# Patient Record
Sex: MALE | Race: WHITE | NOT HISPANIC OR LATINO | Employment: UNEMPLOYED | URBAN - METROPOLITAN AREA
[De-identification: names, ages, dates, MRNs, and addresses within clinical notes are randomized per-mention and may not be internally consistent; named-entity substitution may affect disease eponyms.]

---

## 2019-11-22 ENCOUNTER — HOSPITAL ENCOUNTER (EMERGENCY)
Facility: HOSPITAL | Age: 49
Discharge: HOME | End: 2019-11-22
Attending: EMERGENCY MEDICINE | Admitting: EMERGENCY MEDICINE

## 2019-11-22 ENCOUNTER — APPOINTMENT (OUTPATIENT)
Dept: GENERAL RADIOLOGY | Facility: HOSPITAL | Age: 49
End: 2019-11-22

## 2019-11-22 VITALS
OXYGEN SATURATION: 96 % | DIASTOLIC BLOOD PRESSURE: 75 MMHG | SYSTOLIC BLOOD PRESSURE: 111 MMHG | TEMPERATURE: 98.5 F | RESPIRATION RATE: 18 BRPM | HEIGHT: 66 IN | BODY MASS INDEX: 28.77 KG/M2 | WEIGHT: 179.01 LBS | HEART RATE: 90 BPM

## 2019-11-22 DIAGNOSIS — R07.9 CHEST PAIN, UNSPECIFIED TYPE: ICD-10-CM

## 2019-11-22 DIAGNOSIS — J01.90 ACUTE NON-RECURRENT SINUSITIS, UNSPECIFIED LOCATION: Primary | ICD-10-CM

## 2019-11-22 LAB
ANION GAP SERPL CALCULATED.3IONS-SCNC: 12 MMOL/L (ref 5–15)
BASOPHILS # BLD AUTO: 0 10*3/MM3 (ref 0–0.2)
BASOPHILS NFR BLD AUTO: 0.2 % (ref 0–1.5)
BUN BLD-MCNC: 14 MG/DL (ref 6–20)
BUN/CREAT SERPL: 14.7 (ref 7–25)
CALCIUM SPEC-SCNC: 9.2 MG/DL (ref 8.6–10.5)
CHLORIDE SERPL-SCNC: 103 MMOL/L (ref 98–107)
CO2 SERPL-SCNC: 24 MMOL/L (ref 22–29)
CREAT BLD-MCNC: 0.95 MG/DL (ref 0.76–1.27)
DEPRECATED RDW RBC AUTO: 45.5 FL (ref 37–54)
EOSINOPHIL # BLD AUTO: 0.2 10*3/MM3 (ref 0–0.4)
EOSINOPHIL NFR BLD AUTO: 1.8 % (ref 0.3–6.2)
ERYTHROCYTE [DISTWIDTH] IN BLOOD BY AUTOMATED COUNT: 14 % (ref 12.3–15.4)
GFR SERPL CREATININE-BSD FRML MDRD: 85 ML/MIN/1.73
GLUCOSE BLD-MCNC: 106 MG/DL (ref 65–99)
HCT VFR BLD AUTO: 39.8 % (ref 37.5–51)
HGB BLD-MCNC: 14 G/DL (ref 13–17.7)
LYMPHOCYTES # BLD AUTO: 2.5 10*3/MM3 (ref 0.7–3.1)
LYMPHOCYTES NFR BLD AUTO: 28.3 % (ref 19.6–45.3)
MCH RBC QN AUTO: 32.3 PG (ref 26.6–33)
MCHC RBC AUTO-ENTMCNC: 35.1 G/DL (ref 31.5–35.7)
MCV RBC AUTO: 92 FL (ref 79–97)
MONOCYTES # BLD AUTO: 0.6 10*3/MM3 (ref 0.1–0.9)
MONOCYTES NFR BLD AUTO: 6.4 % (ref 5–12)
NEUTROPHILS # BLD AUTO: 5.5 10*3/MM3 (ref 1.7–7)
NEUTROPHILS NFR BLD AUTO: 63.3 % (ref 42.7–76)
NRBC BLD AUTO-RTO: 0.3 /100 WBC (ref 0–0.2)
PLATELET # BLD AUTO: 145 10*3/MM3 (ref 140–450)
PMV BLD AUTO: 9.1 FL (ref 6–12)
POTASSIUM BLD-SCNC: 4.1 MMOL/L (ref 3.5–5.2)
RBC # BLD AUTO: 4.33 10*6/MM3 (ref 4.14–5.8)
SODIUM BLD-SCNC: 139 MMOL/L (ref 136–145)
TROPONIN T SERPL-MCNC: <0.01 NG/ML (ref 0–0.03)
WBC NRBC COR # BLD: 8.7 10*3/MM3 (ref 3.4–10.8)

## 2019-11-22 PROCEDURE — 85025 COMPLETE CBC W/AUTO DIFF WBC: CPT | Performed by: EMERGENCY MEDICINE

## 2019-11-22 PROCEDURE — 84484 ASSAY OF TROPONIN QUANT: CPT | Performed by: EMERGENCY MEDICINE

## 2019-11-22 PROCEDURE — 93005 ELECTROCARDIOGRAM TRACING: CPT | Performed by: EMERGENCY MEDICINE

## 2019-11-22 PROCEDURE — 80048 BASIC METABOLIC PNL TOTAL CA: CPT | Performed by: EMERGENCY MEDICINE

## 2019-11-22 PROCEDURE — 71046 X-RAY EXAM CHEST 2 VIEWS: CPT

## 2019-11-22 PROCEDURE — 99283 EMERGENCY DEPT VISIT LOW MDM: CPT

## 2019-11-22 RX ORDER — AMOXICILLIN 500 MG/1
500 CAPSULE ORAL 3 TIMES DAILY
Qty: 30 CAPSULE | Refills: 0 | Status: SHIPPED | OUTPATIENT
Start: 2019-11-22 | End: 2021-07-14

## 2019-11-22 NOTE — ED PROVIDER NOTES
Subjective   49-year-old male with 1 week of cough productive yellow sputum associated sinus congestion and pain over sinuses, muscle aches, intermittent chest pain with cough.  No febrile sick contacts.  Patient also reports subjective fever with chills, none documented.  Patient's last antipyretics were ibuprofen at noon.  Symptoms have been moderate, again chest pain worse with cough.            Review of Systems   Constitutional: Positive for chills and fatigue.   HENT: Positive for congestion and sinus pain.    Respiratory: Positive for cough. Negative for shortness of breath.    Cardiovascular: Positive for chest pain.   Musculoskeletal: Positive for myalgias.   Neurological: Positive for headaches.   All other systems reviewed and are negative.      No past medical history on file.    No Known Allergies    No past surgical history on file.    No family history on file.    Social History     Socioeconomic History   • Marital status:      Spouse name: Not on file   • Number of children: Not on file   • Years of education: Not on file   • Highest education level: Not on file           Objective   Physical Exam   Constitutional: He is oriented to person, place, and time. He appears well-developed and well-nourished.   HENT:   Head: Normocephalic and atraumatic.   Mouth/Throat: Oropharynx is clear and moist.   Eyes:   Right pupil irregular status post remote trauma, left pupil round reactive to light   Neck: Normal range of motion. Neck supple.   Cardiovascular: Normal rate, regular rhythm and normal heart sounds.   Pulmonary/Chest: Effort normal and breath sounds normal.   Abdominal: Soft. Bowel sounds are normal. He exhibits no distension. There is no tenderness.   Musculoskeletal: Normal range of motion. He exhibits no edema or deformity.   Neurological: He is alert and oriented to person, place, and time.   Skin: Skin is warm and dry. Capillary refill takes less than 2 seconds.   Psychiatric: He has a  normal mood and affect. His behavior is normal.       Procedures           ED Course  ED Course as of Nov 22 0222 Fri Nov 22, 2019   0143 EKG interpretation: Normal sinus rhythm, rate 82, no acute ST elevation.  [JR]      ED Course User Index  [JR] Frank Lees MD                  MDM  Number of Diagnoses or Management Options  Acute non-recurrent sinusitis, unspecified location:   Chest pain, unspecified type:   Diagnosis management comments: Results for orders placed or performed during the hospital encounter of 11/22/19  -Basic Metabolic Panel       Result                      Value             Ref Range           Glucose                     106 (H)           65 - 99 mg/dL       BUN                         14                6 - 20 mg/dL        Creatinine                  0.95              0.76 - 1.27 *       Sodium                      139               136 - 145 mm*       Potassium                   4.1               3.5 - 5.2 mm*       Chloride                    103               98 - 107 mmo*       CO2                         24.0              22.0 - 29.0 *       Calcium                     9.2               8.6 - 10.5 m*       eGFR Non African Amer       85                >60 mL/min/1*       BUN/Creatinine Ratio        14.7              7.0 - 25.0          Anion Gap                   12.0              5.0 - 15.0 m*  -Troponin       Result                      Value             Ref Range           Troponin T                  <0.010            0.000 - 0.03*  -CBC Auto Differential       Result                      Value             Ref Range           WBC                         8.70              3.40 - 10.80*       RBC                         4.33              4.14 - 5.80 *       Hemoglobin                  14.0              13.0 - 17.7 *       Hematocrit                  39.8              37.5 - 51.0 %       MCV                         92.0              79.0 - 97.0 *       MCH                          32.3              26.6 - 33.0 *       MCHC                        35.1              31.5 - 35.7 *       RDW                         14.0              12.3 - 15.4 %       RDW-SD                      45.5              37.0 - 54.0 *       MPV                         9.1               6.0 - 12.0 fL       Platelets                   145               140 - 450 10*       Neutrophil %                63.3              42.7 - 76.0 %       Lymphocyte %                28.3              19.6 - 45.3 %       Monocyte %                  6.4               5.0 - 12.0 %        Eosinophil %                1.8               0.3 - 6.2 %         Basophil %                  0.2               0.0 - 1.5 %         Neutrophils, Absolute       5.50              1.70 - 7.00 *       Lymphocytes, Absolute       2.50              0.70 - 3.10 *       Monocytes, Absolute         0.60              0.10 - 0.90 *       Eosinophils, Absolute       0.20              0.00 - 0.40 *       Basophils, Absolute         0.00              0.00 - 0.20 *       nRBC                        0.3 (H)           0.0 - 0.2 /1*         Amount and/or Complexity of Data Reviewed  Clinical lab tests: reviewed  Tests in the radiology section of CPT®: reviewed  Independent visualization of images, tracings, or specimens: yes        Final diagnoses:   Acute non-recurrent sinusitis, unspecified location   Chest pain, unspecified type              Frank Lees MD  11/22/19 3738

## 2019-11-22 NOTE — ED NOTES
Pt reports to having chills, fever, cough, and headache for a week.  His grandchildren were sick last week with sinus infections.  No medications taken since 1200 11/21/19.     Krystle Melchor RN  11/22/19 0106

## 2021-07-14 ENCOUNTER — HOSPITAL ENCOUNTER (INPATIENT)
Facility: HOSPITAL | Age: 51
LOS: 4 days | Discharge: HOME | End: 2021-07-18
Attending: INTERNAL MEDICINE | Admitting: INTERNAL MEDICINE

## 2021-07-14 ENCOUNTER — APPOINTMENT (OUTPATIENT)
Dept: GENERAL RADIOLOGY | Facility: HOSPITAL | Age: 51
End: 2021-07-14

## 2021-07-14 DIAGNOSIS — L03.116 CELLULITIS OF LEFT LOWER EXTREMITY: Primary | ICD-10-CM

## 2021-07-14 PROBLEM — A41.9 SEPSIS (HCC): Status: ACTIVE | Noted: 2021-07-14

## 2021-07-14 LAB
ALBUMIN SERPL-MCNC: 4 G/DL (ref 3.5–5.2)
ALBUMIN/GLOB SERPL: 1.5 G/DL
ALP SERPL-CCNC: 105 U/L (ref 39–117)
ALT SERPL W P-5'-P-CCNC: 19 U/L (ref 1–41)
ANION GAP SERPL CALCULATED.3IONS-SCNC: 12 MMOL/L (ref 5–15)
AST SERPL-CCNC: 13 U/L (ref 1–40)
BASOPHILS # BLD AUTO: 0.1 10*3/MM3 (ref 0–0.2)
BASOPHILS NFR BLD AUTO: 0.8 % (ref 0–1.5)
BILIRUB SERPL-MCNC: 0.2 MG/DL (ref 0–1.2)
BUN SERPL-MCNC: 16 MG/DL (ref 6–20)
BUN/CREAT SERPL: 14 (ref 7–25)
CALCIUM SPEC-SCNC: 9.1 MG/DL (ref 8.6–10.5)
CHLORIDE SERPL-SCNC: 103 MMOL/L (ref 98–107)
CO2 SERPL-SCNC: 25 MMOL/L (ref 22–29)
CREAT SERPL-MCNC: 1.14 MG/DL (ref 0.76–1.27)
CRP SERPL-MCNC: 8.6 MG/DL (ref 0–0.5)
D-LACTATE SERPL-SCNC: 1.5 MMOL/L (ref 0.5–2)
DEPRECATED RDW RBC AUTO: 42.9 FL (ref 37–54)
EOSINOPHIL # BLD AUTO: 0.2 10*3/MM3 (ref 0–0.4)
EOSINOPHIL NFR BLD AUTO: 2.2 % (ref 0.3–6.2)
ERYTHROCYTE [DISTWIDTH] IN BLOOD BY AUTOMATED COUNT: 13.6 % (ref 12.3–15.4)
ERYTHROCYTE [SEDIMENTATION RATE] IN BLOOD: 37 MM/HR (ref 0–15)
GFR SERPL CREATININE-BSD FRML MDRD: 68 ML/MIN/1.73
GLOBULIN UR ELPH-MCNC: 2.7 GM/DL
GLUCOSE SERPL-MCNC: 104 MG/DL (ref 65–99)
HCT VFR BLD AUTO: 41.5 % (ref 37.5–51)
HGB BLD-MCNC: 14.3 G/DL (ref 13–17.7)
HOLD SPECIMEN: NORMAL
LYMPHOCYTES # BLD AUTO: 1.8 10*3/MM3 (ref 0.7–3.1)
LYMPHOCYTES NFR BLD AUTO: 24.2 % (ref 19.6–45.3)
MAGNESIUM SERPL-MCNC: 2 MG/DL (ref 1.6–2.6)
MCH RBC QN AUTO: 31.7 PG (ref 26.6–33)
MCHC RBC AUTO-ENTMCNC: 34.6 G/DL (ref 31.5–35.7)
MCV RBC AUTO: 91.7 FL (ref 79–97)
MONOCYTES # BLD AUTO: 0.3 10*3/MM3 (ref 0.1–0.9)
MONOCYTES NFR BLD AUTO: 4.6 % (ref 5–12)
NEUTROPHILS NFR BLD AUTO: 5 10*3/MM3 (ref 1.7–7)
NEUTROPHILS NFR BLD AUTO: 68.2 % (ref 42.7–76)
NRBC BLD AUTO-RTO: 0.1 /100 WBC (ref 0–0.2)
PLATELET # BLD AUTO: 138 10*3/MM3 (ref 140–450)
PMV BLD AUTO: 9.4 FL (ref 6–12)
POTASSIUM SERPL-SCNC: 4.2 MMOL/L (ref 3.5–5.2)
PROCALCITONIN SERPL-MCNC: 0.46 NG/ML (ref 0–0.25)
PROT SERPL-MCNC: 6.7 G/DL (ref 6–8.5)
RBC # BLD AUTO: 4.53 10*6/MM3 (ref 4.14–5.8)
SODIUM SERPL-SCNC: 140 MMOL/L (ref 136–145)
WBC # BLD AUTO: 7.3 10*3/MM3 (ref 3.4–10.8)
WHOLE BLOOD HOLD SPECIMEN: NORMAL

## 2021-07-14 PROCEDURE — 87040 BLOOD CULTURE FOR BACTERIA: CPT | Performed by: NURSE PRACTITIONER

## 2021-07-14 PROCEDURE — 80053 COMPREHEN METABOLIC PANEL: CPT | Performed by: NURSE PRACTITIONER

## 2021-07-14 PROCEDURE — 36415 COLL VENOUS BLD VENIPUNCTURE: CPT

## 2021-07-14 PROCEDURE — U0004 COV-19 TEST NON-CDC HGH THRU: HCPCS | Performed by: INTERNAL MEDICINE

## 2021-07-14 PROCEDURE — 85652 RBC SED RATE AUTOMATED: CPT | Performed by: NURSE PRACTITIONER

## 2021-07-14 PROCEDURE — G0378 HOSPITAL OBSERVATION PER HR: HCPCS

## 2021-07-14 PROCEDURE — 73610 X-RAY EXAM OF ANKLE: CPT

## 2021-07-14 PROCEDURE — 84145 PROCALCITONIN (PCT): CPT | Performed by: NURSE PRACTITIONER

## 2021-07-14 PROCEDURE — 25010000002 VANCOMYCIN PER 500 MG: Performed by: NURSE PRACTITIONER

## 2021-07-14 PROCEDURE — 87641 MR-STAPH DNA AMP PROBE: CPT | Performed by: NURSE PRACTITIONER

## 2021-07-14 PROCEDURE — 86140 C-REACTIVE PROTEIN: CPT | Performed by: NURSE PRACTITIONER

## 2021-07-14 PROCEDURE — 25010000002 VANCOMYCIN 10 G RECONSTITUTED SOLUTION: Performed by: NURSE PRACTITIONER

## 2021-07-14 PROCEDURE — 83605 ASSAY OF LACTIC ACID: CPT

## 2021-07-14 PROCEDURE — 93010 ELECTROCARDIOGRAM REPORT: CPT | Performed by: INTERNAL MEDICINE

## 2021-07-14 PROCEDURE — 25010000002 CEFTRIAXONE PER 250 MG: Performed by: NURSE PRACTITIONER

## 2021-07-14 PROCEDURE — 99219 PR INITIAL OBSERVATION CARE/DAY 50 MINUTES: CPT | Performed by: NURSE PRACTITIONER

## 2021-07-14 PROCEDURE — 83735 ASSAY OF MAGNESIUM: CPT | Performed by: NURSE PRACTITIONER

## 2021-07-14 PROCEDURE — 85025 COMPLETE CBC W/AUTO DIFF WBC: CPT | Performed by: NURSE PRACTITIONER

## 2021-07-14 PROCEDURE — 83036 HEMOGLOBIN GLYCOSYLATED A1C: CPT | Performed by: NURSE PRACTITIONER

## 2021-07-14 PROCEDURE — 73630 X-RAY EXAM OF FOOT: CPT

## 2021-07-14 PROCEDURE — 93005 ELECTROCARDIOGRAM TRACING: CPT | Performed by: NURSE PRACTITIONER

## 2021-07-14 PROCEDURE — 99284 EMERGENCY DEPT VISIT MOD MDM: CPT

## 2021-07-14 RX ORDER — SODIUM CHLORIDE 9 MG/ML
75 INJECTION, SOLUTION INTRAVENOUS CONTINUOUS
Status: DISCONTINUED | OUTPATIENT
Start: 2021-07-14 | End: 2021-07-17

## 2021-07-14 RX ORDER — ACETAMINOPHEN 325 MG/1
650 TABLET ORAL EVERY 4 HOURS PRN
Status: DISCONTINUED | OUTPATIENT
Start: 2021-07-14 | End: 2021-07-18 | Stop reason: HOSPADM

## 2021-07-14 RX ORDER — ACETAMINOPHEN 650 MG/1
650 SUPPOSITORY RECTAL EVERY 4 HOURS PRN
Status: DISCONTINUED | OUTPATIENT
Start: 2021-07-14 | End: 2021-07-18 | Stop reason: HOSPADM

## 2021-07-14 RX ORDER — CLINDAMYCIN HYDROCHLORIDE 300 MG/1
300 CAPSULE ORAL 4 TIMES DAILY
COMMUNITY
Start: 2021-07-13 | End: 2021-07-18 | Stop reason: HOSPADM

## 2021-07-14 RX ORDER — CHOLECALCIFEROL (VITAMIN D3) 125 MCG
5 CAPSULE ORAL NIGHTLY PRN
Status: DISCONTINUED | OUTPATIENT
Start: 2021-07-14 | End: 2021-07-18 | Stop reason: HOSPADM

## 2021-07-14 RX ORDER — SODIUM CHLORIDE 0.9 % (FLUSH) 0.9 %
10 SYRINGE (ML) INJECTION AS NEEDED
Status: DISCONTINUED | OUTPATIENT
Start: 2021-07-14 | End: 2021-07-18 | Stop reason: HOSPADM

## 2021-07-14 RX ORDER — SODIUM CHLORIDE 0.9 % (FLUSH) 0.9 %
10 SYRINGE (ML) INJECTION EVERY 12 HOURS SCHEDULED
Status: DISCONTINUED | OUTPATIENT
Start: 2021-07-14 | End: 2021-07-18 | Stop reason: HOSPADM

## 2021-07-14 RX ORDER — TRAMADOL HYDROCHLORIDE 50 MG/1
50 TABLET ORAL EVERY 6 HOURS PRN
Status: DISCONTINUED | OUTPATIENT
Start: 2021-07-14 | End: 2021-07-18 | Stop reason: HOSPADM

## 2021-07-14 RX ORDER — POTASSIUM CHLORIDE 20 MEQ/1
40 TABLET, EXTENDED RELEASE ORAL AS NEEDED
Status: DISCONTINUED | OUTPATIENT
Start: 2021-07-14 | End: 2021-07-18 | Stop reason: HOSPADM

## 2021-07-14 RX ORDER — KETOROLAC TROMETHAMINE 30 MG/ML
30 INJECTION, SOLUTION INTRAMUSCULAR; INTRAVENOUS EVERY 6 HOURS PRN
Status: ACTIVE | OUTPATIENT
Start: 2021-07-14 | End: 2021-07-16

## 2021-07-14 RX ORDER — MAGNESIUM SULFATE HEPTAHYDRATE 40 MG/ML
2 INJECTION, SOLUTION INTRAVENOUS AS NEEDED
Status: DISCONTINUED | OUTPATIENT
Start: 2021-07-14 | End: 2021-07-18 | Stop reason: HOSPADM

## 2021-07-14 RX ORDER — NITROGLYCERIN 0.4 MG/1
0.4 TABLET SUBLINGUAL
Status: DISCONTINUED | OUTPATIENT
Start: 2021-07-14 | End: 2021-07-18 | Stop reason: HOSPADM

## 2021-07-14 RX ORDER — ACETAMINOPHEN 160 MG/5ML
650 SOLUTION ORAL EVERY 4 HOURS PRN
Status: DISCONTINUED | OUTPATIENT
Start: 2021-07-14 | End: 2021-07-18 | Stop reason: HOSPADM

## 2021-07-14 RX ORDER — ONDANSETRON 4 MG/1
4 TABLET, FILM COATED ORAL EVERY 6 HOURS PRN
Status: DISCONTINUED | OUTPATIENT
Start: 2021-07-14 | End: 2021-07-18 | Stop reason: HOSPADM

## 2021-07-14 RX ORDER — MAGNESIUM SULFATE 1 G/100ML
1 INJECTION INTRAVENOUS AS NEEDED
Status: DISCONTINUED | OUTPATIENT
Start: 2021-07-14 | End: 2021-07-18 | Stop reason: HOSPADM

## 2021-07-14 RX ORDER — HYDROCODONE BITARTRATE AND ACETAMINOPHEN 7.5; 325 MG/1; MG/1
1 TABLET ORAL EVERY 6 HOURS PRN
Status: DISCONTINUED | OUTPATIENT
Start: 2021-07-14 | End: 2021-07-18 | Stop reason: HOSPADM

## 2021-07-14 RX ORDER — ALUMINA, MAGNESIA, AND SIMETHICONE 2400; 2400; 240 MG/30ML; MG/30ML; MG/30ML
15 SUSPENSION ORAL EVERY 6 HOURS PRN
Status: DISCONTINUED | OUTPATIENT
Start: 2021-07-14 | End: 2021-07-18 | Stop reason: HOSPADM

## 2021-07-14 RX ORDER — ONDANSETRON 2 MG/ML
4 INJECTION INTRAMUSCULAR; INTRAVENOUS EVERY 6 HOURS PRN
Status: DISCONTINUED | OUTPATIENT
Start: 2021-07-14 | End: 2021-07-18 | Stop reason: HOSPADM

## 2021-07-14 RX ADMIN — HYDROCODONE BITARTRATE AND ACETAMINOPHEN 1 TABLET: 7.5; 325 TABLET ORAL at 23:05

## 2021-07-14 RX ADMIN — Medication 10 ML: at 23:06

## 2021-07-14 RX ADMIN — SODIUM CHLORIDE 125 ML/HR: 9 INJECTION, SOLUTION INTRAVENOUS at 23:05

## 2021-07-14 RX ADMIN — SODIUM CHLORIDE 2313 ML: 9 INJECTION, SOLUTION INTRAVENOUS at 23:04

## 2021-07-14 RX ADMIN — VANCOMYCIN HYDROCHLORIDE 1500 MG: 500 INJECTION, POWDER, LYOPHILIZED, FOR SOLUTION INTRAVENOUS at 19:48

## 2021-07-14 RX ADMIN — CEFTRIAXONE SODIUM 1 G: 1 INJECTION, POWDER, FOR SOLUTION INTRAMUSCULAR; INTRAVENOUS at 22:44

## 2021-07-14 NOTE — ED NOTES
Patient presents with a red, ramona, warm left foot that has 3+ pitting edema, very painful to the touch.  Patient states that it started three days ago and he went to [facility] two days ago he said he started an ATB.  [facility] ED said to come back to the ED if his symptoms worsen.       Venus Young, RN  07/14/21 8374

## 2021-07-14 NOTE — ED PROVIDER NOTES
Subjective   Patient presents with:  Cellulitis: dx with cellulitis at [facility] 2 days ago, pt states getting wore, heat, redness, swelling and pain to left foot.    Provider, No Known  No LMP for male patient.  No Known Allergies  Onset: This week    Context:  Patient is a 50-year-old male, presents emergency department with a complaint of left foot pain, redness and swelling.  Patient reports this began earlier this week, he states that he felt his foot was swollen in his boot, and noticed he had redness.  He was seen at [facility] 2 days ago, started on clindamycin, he reports the redness, swelling and pain is worsened.  He denies any fever or chills.  No history of IV drug use.          Review of Systems   Constitutional: Negative for chills and fever.   Respiratory: Negative for cough, chest tightness and shortness of breath.    Cardiovascular: Negative for chest pain, palpitations and leg swelling.   Gastrointestinal: Negative for abdominal pain, diarrhea, nausea and vomiting.   Musculoskeletal: Negative for back pain and neck pain.        Left foot pain, swelling   Skin: Positive for color change. Negative for rash.       History reviewed. No pertinent past medical history.    No Known Allergies    History reviewed. No pertinent surgical history.    History reviewed. No pertinent family history.    Social History     Socioeconomic History   • Marital status:      Spouse name: Not on file   • Number of children: Not on file   • Years of education: Not on file   • Highest education level: Not on file   Tobacco Use   • Smoking status: Current Every Day Smoker     Packs/day: 1.00     Start date: 7/14/1986   • Smokeless tobacco: Never Used   Vaping Use   • Vaping Use: Never used   Substance and Sexual Activity   • Alcohol use: Never   • Drug use: Never   • Sexual activity: Defer           Objective   Physical Exam  Vitals and nursing note reviewed.   Constitutional:       General: He is not in acute  "distress.     Appearance: Normal appearance. He is not ill-appearing, toxic-appearing or diaphoretic.   HENT:      Head: Normocephalic and atraumatic.      Nose: Nose normal.      Mouth/Throat:      Mouth: Mucous membranes are moist.      Pharynx: Oropharynx is clear.   Eyes:      Extraocular Movements: Extraocular movements intact.      Conjunctiva/sclera: Conjunctivae normal.      Pupils: Pupils are equal, round, and reactive to light.   Cardiovascular:      Rate and Rhythm: Normal rate and regular rhythm.      Pulses: Normal pulses.      Heart sounds: Normal heart sounds. No murmur heard.   No friction rub. No gallop.    Pulmonary:      Effort: Pulmonary effort is normal.      Breath sounds: Normal breath sounds.   Abdominal:      General: Bowel sounds are normal.      Palpations: Abdomen is soft.   Musculoskeletal:         General: Normal range of motion.      Cervical back: Normal range of motion and neck supple.        Legs:       Comments: Erythema noted to left foot, faint streaking noted into the leg.  Range of motion of the joint present.  No bony tenderness is noted.  Pedal pulses intact.  No evidence for abscess.   Skin:     General: Skin is warm and dry.      Capillary Refill: Capillary refill takes less than 2 seconds.      Findings: Erythema present.   Neurological:      General: No focal deficit present.      Mental Status: He is alert and oriented to person, place, and time.   Psychiatric:         Mood and Affect: Mood normal.         Behavior: Behavior normal.         Procedures           ED Course      /74 (BP Location: Left arm, Patient Position: Lying)   Pulse 104   Temp 99 °F (37.2 °C) (Oral)   Resp 16   Ht 167.6 cm (66\")   Wt 77.1 kg (170 lb)   SpO2 94%   BMI 27.44 kg/m²   Labs Reviewed   COMPREHENSIVE METABOLIC PANEL - Abnormal; Notable for the following components:       Result Value    Glucose 104 (*)     All other components within normal limits    Narrative:     GFR Normal " ">60  Chronic Kidney Disease <60  Kidney Failure <15     C-REACTIVE PROTEIN - Abnormal; Notable for the following components:    C-Reactive Protein 8.60 (*)     All other components within normal limits   SEDIMENTATION RATE - Abnormal; Notable for the following components:    Sed Rate 37 (*)     All other components within normal limits   CBC WITH AUTO DIFFERENTIAL - Abnormal; Notable for the following components:    Platelets 138 (*)     Monocyte % 4.6 (*)     All other components within normal limits   PROCALCITONIN - Abnormal; Notable for the following components:    Procalcitonin 0.46 (*)     All other components within normal limits    Narrative:     As a Marker for Sepsis (Non-Neonates):     1. <0.5 ng/mL represents a low risk of severe sepsis and/or septic shock.  2. >2 ng/mL represents a high risk of severe sepsis and/or septic shock.    As a Marker for Lower Respiratory Tract Infections that require antibiotic therapy:  PCT on Admission     Antibiotic Therapy             6-12 Hrs later  >0.5                          Strongly Recommended            >0.25 - <0.5             Recommended  0.1 - 0.25                  Discouraged                       Remeasure/reassess PCT  <0.1                         Strongly Discouraged         Remeasure/reassess PCT      As 28 day mortality risk marker: \"Change in Procalcitonin Result\" (>80% or <=80%) if Day 0 (or Day 1) and Day 4 values are available. Refer to http://www.[employer]-pct-calculator.com/    Change in PCT <=80 %   A decrease of PCT levels below or equal to 80% defines a positive change in PCT test result representing a higher risk for 28-day all-cause mortality of patients diagnosed with severe sepsis or septic shock.    Change in PCT >80 %   A decrease of PCT levels of more than 80% defines a negative change in PCT result representing a lower risk for 28-day all-cause mortality of patients diagnosed with severe sepsis or septic shock.              Results may be " falsely decreased if patient taking Biotin.    COMPREHENSIVE METABOLIC PANEL - Abnormal; Notable for the following components:    Glucose 124 (*)     Calcium 8.3 (*)     Albumin 3.40 (*)     All other components within normal limits    Narrative:     GFR Normal >60  Chronic Kidney Disease <60  Kidney Failure <15     CBC WITH AUTO DIFFERENTIAL - Abnormal; Notable for the following components:    RBC 4.08 (*)     Hemoglobin 12.9 (*)     Hematocrit 37.3 (*)     Platelets 138 (*)     All other components within normal limits   LACTIC ACID, PLASMA - Normal   MAGNESIUM - Normal   MAGNESIUM - Normal   POC LACTATE - Normal   BLOOD CULTURE   BLOOD CULTURE   COVID PRE-OP / PRE-PROCEDURE SCREENING ORDER (NO ISOLATION)    Narrative:     The following orders were created for panel order COVID PRE-OP / PRE-PROCEDURE SCREENING ORDER (NO ISOLATION) - Swab, Nasopharynx.  Procedure                               Abnormality         Status                     ---------                               -----------         ------                     COVID-19,APTIMA PANTHER,...[[id number]]                      In process                   Please view results for these tests on the individual orders.   COVID-19,APTIMA PANTHER,JAMIE IN-HOUSE,NP/OP SWAB IN UTM/VTM/SALINE TRANSPORT MEDIA,24 HR TAT   MRSA SCREEN, PCR   RAINBOW DRAW    Narrative:     The following orders were created for panel order Brea Draw.  Procedure                               Abnormality         Status                     ---------                               -----------         ------                     Lavender Top[[id number]]                                     Final result                 Please view results for these tests on the individual orders.   HEMOGLOBIN A1C   URINE DRUG SCREEN   URINALYSIS W/ CULTURE IF INDICATED   LACTIC ACID, PLASMA   POC LACTATE   CBC AND DIFFERENTIAL    Narrative:     The following orders were created for panel order CBC &  Differential.  Procedure                               Abnormality         Status                     ---------                               -----------         ------                     CBC Auto Differential[[id number]]        Abnormal            Final result                 Please view results for these tests on the individual orders.   EXTRA TUBES    Narrative:     The following orders were created for panel order Extra Tubes.  Procedure                               Abnormality         Status                     ---------                               -----------         ------                     Gold Top - SST[[id number]]                                   Final result                 Please view results for these tests on the individual orders.   GOLD TOP - SST   LAVENDER TOP   CBC AND DIFFERENTIAL    Narrative:     The following orders were created for panel order CBC & Differential.  Procedure                               Abnormality         Status                     ---------                               -----------         ------                     CBC Auto Differential[[id number]]        Abnormal            Final result                 Please view results for these tests on the individual orders.     Medications   sodium chloride 0.9 % flush 10 mL (has no administration in time range)   nitroglycerin (NITROSTAT) SL tablet 0.4 mg (has no administration in time range)   sodium chloride 0.9 % flush 10 mL (10 mL Intravenous Given 7/14/21 6676)   sodium chloride 0.9 % flush 10 mL (has no administration in time range)   acetaminophen (TYLENOL) tablet 650 mg (has no administration in time range)     Or   acetaminophen (TYLENOL) 160 MG/5ML solution 650 mg (has no administration in time range)     Or   acetaminophen (TYLENOL) suppository 650 mg (has no administration in time range)   aluminum-magnesium hydroxide-simethicone (MAALOX MAX) 400-400-40 MG/5ML suspension 15 mL (has no administration in time range)    ondansetron (ZOFRAN) tablet 4 mg (has no administration in time range)     Or   ondansetron (ZOFRAN) injection 4 mg (has no administration in time range)   melatonin tablet 5 mg (has no administration in time range)   potassium chloride (K-DUR,KLOR-CON) CR tablet 40 mEq (has no administration in time range)   Magnesium Sulfate 2 gram infusion - Mg less than or equal to 1.5 mg/dL (has no administration in time range)     Or   Magnesium Sulfate 1 gram infusion - Mg 1.6-1.9 mg/dL (has no administration in time range)   enoxaparin (LOVENOX) syringe 40 mg (has no administration in time range)   sodium chloride 0.9 % infusion (125 mL/hr Intravenous New Bag 7/14/21 2305)   ketorolac (TORADOL) injection 30 mg (has no administration in time range)   traMADol (ULTRAM) tablet 50 mg (has no administration in time range)   HYDROcodone-acetaminophen (NORCO) 7.5-325 MG per tablet 1 tablet (1 tablet Oral Given 7/14/21 2305)   Pharmacy to dose vancomycin (has no administration in time range)   cefTRIAXone (ROCEPHIN) 1 g in sodium chloride 0.9 % 100 mL IVPB (1 g Intravenous New Bag 7/14/21 2244)   vancomycin (VANCOCIN) 1,000 mg in sodium chloride 0.9 % 250 mL IVPB (has no administration in time range)   vancomycin 1500 mg/500 mL 0.9% NS IVPB ([facility]) (0 mg Intravenous Stopped 7/14/21 2245)   sodium chloride 0.9 % bolus 2,313 mL (2,313 mL Intravenous New Bag 7/14/21 2304)     XR Ankle 3+ View Left    Result Date: 7/14/2021  1.Diffuse soft tissue selling. 2.6 mm osseous density at the distal tip of the lateral malleolus, likely an old fracture fragment. Recommend correlation with physical exam.  Electronically Signed By-Vannessa Levi MD On:7/14/2021 6:45 PM This report was finalized on [id number] by  Vannessa Levi MD.    XR Foot 3+ View Left    Result Date: 7/14/2021  Diffuse soft tissue swelling without definite fracture.  Electronically Signed By-Vannessa Levi MD On:7/14/2021 6:44 PM This report was finalized on [id number]  by  Vannessa Levi MD.                                         MDM  Number of Diagnoses or Management Options  Cellulitis of left lower extremity  Diagnosis management comments: Appropriate PPE was worn during the duration of the care for this patient while in the emergency department per [facility] Policy    ----Differentials: Abscess, septic arthritis, cellulitis  This list is not all inclusive and does not constitute the entireity of considered causes.     ----Lab and imaging reviewd by me, imaging interpreted per radiologist and independly viewed by me: As above    ED  Course----Patient was brought back to the emergency department room for evaluation and placed on appropriate monitoring.      Vital signs have  been reviewed. Patient is afebrile. Non toxic in appearance. Alert and oriented to person, place, time and situation.  Patient has findings concerning for cellulitis of the left lower extremity, he does have streaking noted, he has been on outpatient antibiotics and had worsening symptoms, he will be admitted for IV antibiotics.    I discussed with the patient their test results, work-up here in the emergency department, and need for admission and further evaluation. Patient is agreeable to the plan of care. At time of disposition patients VS are non concerning, and without acute distress.  Opportunity was provided for questions at the bedside, all questions and concerns were addressed.                               Amount and/or Complexity of Data Reviewed  Clinical lab tests: reviewed  Tests in the radiology section of CPT®: reviewed  Discuss the patient with other providers: yes (Hospitalist)    Patient Progress  Patient progress: stable      Final diagnoses:   Cellulitis of left lower extremity       ED Disposition  ED Disposition     ED Disposition Condition Comment    Decision to Admit  Level of Care: Med/Surg [1]   Diagnosis: Cellulitis of left lower extremity [041600]   Admitting Physician:  ALEXY PRIEST N [[id number]]   Attending Physician: ALEXY PRIEST N [[id number]]            No follow-up provider specified.       Medication List      No changes were made to your prescriptions during this visit.          Joan Wagner, APRN  07/15/21 0146

## 2021-07-14 NOTE — H&P
[facility] Medicine Services      Patient Name: Boby Samson  : 1970  MRN: [id number]  Primary Care Physician:  Provider, No Known  Date of admission: 2021      Subjective          Chief Complaint: Left lower extremity edema      History of Present Illness:     Boby Samson is a 50 y.o. male with no significant PMH who presents to [facility] ED due to left lower extremity edema.  Patient states left lower extremity edema has progressively worsened over the last 7 days, pain is worse with palpation and exertion and relieved with rest.  Patient denies any other current symptoms.  Patient reports going to [facility] ED and receiving clindamycin which he has been taking for 2 days with no improvement so he decided to come to [facility] ED.    Upon arrival to the ED vitals temp 97 9, , RR 18, /72, O2 sat 96% on room air.  Labs notable for glucose 104, , K4.2, creatinine 1.14, BUN 16, ALT 19, AST 13, total bili 0.2, CRP 8.6, lactic 1.5, WBC 7.3, Hgb 14.3, neutrophils 68.2, sed rate 37.  XR left ankle shows diffuse soft tissue swelling. 2.6 mm osseous density at the distal tip of the lateral malleolus, likely an old fracture fragment. Recommend correlation with physical exam.  XR left foot shows there is diffuse soft tissue swelling which appears greatest over the forefoot. No acute fracture or dislocation is seen. There is a small plantar calcaneal spur. Probable old fracture fragment is seen at the distal tip of the lateral malleolus.  Patient treated in the ED with IV vancomycin.      Review of Systems   Constitutional: Negative.   HENT: Negative.    Eyes: Negative.    Cardiovascular: Negative.    Respiratory: Negative.    Endocrine: Negative.    Hematologic/Lymphatic: Negative.    Skin: Positive for color change, dry skin and poor wound healing.   Musculoskeletal: Negative.    Gastrointestinal: Negative.    Genitourinary: Negative.    Neurological:  Negative.    Psychiatric/Behavioral: Negative.    Allergic/Immunologic: Negative.    All other systems reviewed and are negative.       Personal History     History reviewed. No pertinent past medical history.    History reviewed. No pertinent surgical history.    Family History:  Otherwise pertinent FHx was reviewed and not pertinent to current issue.    Social History:  reports that he has been smoking. He started smoking about 35 years ago. He has been smoking about 1.00 pack per day. He has never used smokeless tobacco. He reports that he does not drink alcohol and does not use drugs.    Home Medications:  Prior to Admission Medications     Prescriptions Last Dose Informant Patient Reported? Taking?    clindamycin (CLEOCIN) 300 MG capsule   Yes Yes    Take 300 mg by mouth 3 (Three) Times a Day.    amoxicillin (AMOXIL) 500 MG capsule   No No    Take 1 capsule by mouth 3 (Three) Times a Day.            Allergies:  No Known Allergies    Objective      Vitals:   Temp:  [97.9 °F (36.6 °C)-99 °F (37.2 °C)] 98.1 °F (36.7 °C)  Heart Rate:  [] 88  Resp:  [16-18] 16  BP: (112-130)/(71-76) 118/71    Physical Exam  Constitutional:       Appearance: Normal appearance. He is normal weight.   HENT:      Head: Normocephalic and atraumatic.      Right Ear: External ear normal.      Left Ear: External ear normal.      Nose: Nose normal.      Mouth/Throat:      Mouth: Mucous membranes are moist.   Eyes:      Pupils: Pupils are equal, round, and reactive to light.   Cardiovascular:      Rate and Rhythm: Normal rate and regular rhythm.      Pulses: Normal pulses.      Heart sounds: Normal heart sounds.   Pulmonary:      Effort: Pulmonary effort is normal.      Breath sounds: Normal breath sounds.   Abdominal:      General: Bowel sounds are normal.      Palpations: Abdomen is soft.   Musculoskeletal:         General: Swelling and tenderness present.      Cervical back: Normal range of motion.      Left lower leg: Edema present.    Skin:     General: Skin is warm.      Findings: Erythema present.   Neurological:      General: No focal deficit present.      Mental Status: He is alert and oriented to person, place, and time. Mental status is at baseline.   Psychiatric:         Attention and Perception: Attention and perception normal.         Mood and Affect: Mood and affect normal.         Speech: Speech normal.         Behavior: Behavior normal.         Thought Content: Thought content normal.         Cognition and Memory: Cognition and memory normal.         Judgment: Judgment normal.          Result Review    Result Review:  I have personally reviewed the results from the time of this admission to 7/15/2021 06:39 EDT and agree with these findings:  [x]  Laboratory  [x]  Microbiology  [x]  Radiology  [x]  EKG/Telemetry   []  Cardiology/Vascular   []  Pathology  []  Old records  []  Other:        Assessment/Plan        Active Hospital Problems:  Active Hospital Problems    Diagnosis    • **Cellulitis of left lower extremity    • Sepsis (CMS/[facility])      Plan:     Left lower extremity cellulitis:  -Failed outpatient therapy after treatment at [facility] ED  -XR left ankle shows diffuse soft tissue swelling. 2.6 mm osseous density at the distal tip of the lateral malleolus, likely an old fracture fragment  -XR left foot shows there is diffuse soft tissue swelling which appears greatest over the forefoot. No acute fracture or dislocation is seen. There is a small plantar calcaneal spur. Probable old fracture fragment is seen at the distal tip of the lateral malleolus  -Patient treated in the ED with IV vancomycin  -Observe overnight, consult podiatry if indicated    Sepsis:  -Upon arrival to the ED heart rate 119, /72, lactic 1.5, neutrophils 68.2, sed rate 37, patient alert and oriented  -Blood cultures have been drawn and results are pending  -On arrival to the ED temperature was  , heart rate  , blood pressure patient alert  and oriented  -Fluid resuscitation with normal saline ordered per sepsis protocol  -Patient received vancomycin IVPB in the ED  - mL/hour continuous IV infusion  -1 g IV Rocephin, pharmacy to dose IV vancomycin  -Trend lactic acid levels to 6 hours x 2                DVT prophylaxis:  Medical DVT prophylaxis orders are present.    CODE STATUS:    Level Of Support Discussed With: Patient  Code Status: CPR  Medical Interventions (Level of Support Prior to Arrest): Full    Admission Status:  I believe this patient meets observation status.    Signature: Electronically signed by SRI Weber, 07/15/21, 6:50 AM EDT.

## 2021-07-15 PROBLEM — F15.10 METHAMPHETAMINE ABUSE (HCC): Status: ACTIVE | Noted: 2021-07-15

## 2021-07-15 LAB
ALBUMIN SERPL-MCNC: 3.4 G/DL (ref 3.5–5.2)
ALBUMIN/GLOB SERPL: 1.1 G/DL
ALP SERPL-CCNC: 91 U/L (ref 39–117)
ALT SERPL W P-5'-P-CCNC: 20 U/L (ref 1–41)
AMPHET+METHAMPHET UR QL: POSITIVE
ANION GAP SERPL CALCULATED.3IONS-SCNC: 9 MMOL/L (ref 5–15)
AST SERPL-CCNC: 16 U/L (ref 1–40)
BARBITURATES UR QL SCN: NEGATIVE
BASOPHILS # BLD AUTO: 0.1 10*3/MM3 (ref 0–0.2)
BASOPHILS NFR BLD AUTO: 1.2 % (ref 0–1.5)
BENZODIAZ UR QL SCN: NEGATIVE
BILIRUB SERPL-MCNC: 0.2 MG/DL (ref 0–1.2)
BILIRUB UR QL STRIP: NEGATIVE
BUN SERPL-MCNC: 15 MG/DL (ref 6–20)
BUN/CREAT SERPL: 14 (ref 7–25)
CALCIUM SPEC-SCNC: 8.3 MG/DL (ref 8.6–10.5)
CANNABINOIDS SERPL QL: NEGATIVE
CHLORIDE SERPL-SCNC: 106 MMOL/L (ref 98–107)
CLARITY UR: CLEAR
CO2 SERPL-SCNC: 25 MMOL/L (ref 22–29)
COCAINE UR QL: NEGATIVE
COLOR UR: YELLOW
CREAT SERPL-MCNC: 1.07 MG/DL (ref 0.76–1.27)
D-LACTATE SERPL-SCNC: 0.8 MMOL/L (ref 0.5–2)
D-LACTATE SERPL-SCNC: 1 MMOL/L (ref 0.5–2)
DEPRECATED RDW RBC AUTO: 43.3 FL (ref 37–54)
EOSINOPHIL # BLD AUTO: 0.2 10*3/MM3 (ref 0–0.4)
EOSINOPHIL NFR BLD AUTO: 2.7 % (ref 0.3–6.2)
ERYTHROCYTE [DISTWIDTH] IN BLOOD BY AUTOMATED COUNT: 13.6 % (ref 12.3–15.4)
GFR SERPL CREATININE-BSD FRML MDRD: 73 ML/MIN/1.73
GLOBULIN UR ELPH-MCNC: 3 GM/DL
GLUCOSE SERPL-MCNC: 124 MG/DL (ref 65–99)
GLUCOSE UR STRIP-MCNC: NEGATIVE MG/DL
HBA1C MFR BLD: 5.8 % (ref 3.5–5.6)
HCT VFR BLD AUTO: 37.3 % (ref 37.5–51)
HGB BLD-MCNC: 12.9 G/DL (ref 13–17.7)
HGB UR QL STRIP.AUTO: NEGATIVE
KETONES UR QL STRIP: NEGATIVE
LEUKOCYTE ESTERASE UR QL STRIP.AUTO: NEGATIVE
LYMPHOCYTES # BLD AUTO: 1.8 10*3/MM3 (ref 0.7–3.1)
LYMPHOCYTES NFR BLD AUTO: 28.2 % (ref 19.6–45.3)
MAGNESIUM SERPL-MCNC: 2 MG/DL (ref 1.6–2.6)
MCH RBC QN AUTO: 31.5 PG (ref 26.6–33)
MCHC RBC AUTO-ENTMCNC: 34.4 G/DL (ref 31.5–35.7)
MCV RBC AUTO: 91.5 FL (ref 79–97)
METHADONE UR QL SCN: NEGATIVE
MONOCYTES # BLD AUTO: 0.4 10*3/MM3 (ref 0.1–0.9)
MONOCYTES NFR BLD AUTO: 5.9 % (ref 5–12)
MRSA DNA SPEC QL NAA+PROBE: NORMAL
NEUTROPHILS NFR BLD AUTO: 4 10*3/MM3 (ref 1.7–7)
NEUTROPHILS NFR BLD AUTO: 62 % (ref 42.7–76)
NITRITE UR QL STRIP: NEGATIVE
NRBC BLD AUTO-RTO: 0 /100 WBC (ref 0–0.2)
OPIATES UR QL: POSITIVE
OXYCODONE UR QL SCN: NEGATIVE
PH UR STRIP.AUTO: 6 [PH] (ref 5–8)
PLATELET # BLD AUTO: 138 10*3/MM3 (ref 140–450)
PMV BLD AUTO: 9.6 FL (ref 6–12)
POTASSIUM SERPL-SCNC: 3.7 MMOL/L (ref 3.5–5.2)
PROT SERPL-MCNC: 6.4 G/DL (ref 6–8.5)
PROT UR QL STRIP: NEGATIVE
RBC # BLD AUTO: 4.08 10*6/MM3 (ref 4.14–5.8)
SARS-COV-2 ORF1AB RESP QL NAA+PROBE: NOT DETECTED
SODIUM SERPL-SCNC: 140 MMOL/L (ref 136–145)
SP GR UR STRIP: 1.03 (ref 1–1.03)
UROBILINOGEN UR QL STRIP: NORMAL
WBC # BLD AUTO: 6.4 10*3/MM3 (ref 3.4–10.8)

## 2021-07-15 PROCEDURE — G0378 HOSPITAL OBSERVATION PER HR: HCPCS

## 2021-07-15 PROCEDURE — 81003 URINALYSIS AUTO W/O SCOPE: CPT | Performed by: NURSE PRACTITIONER

## 2021-07-15 PROCEDURE — 80307 DRUG TEST PRSMV CHEM ANLYZR: CPT | Performed by: NURSE PRACTITIONER

## 2021-07-15 PROCEDURE — 80053 COMPREHEN METABOLIC PANEL: CPT | Performed by: NURSE PRACTITIONER

## 2021-07-15 PROCEDURE — 83605 ASSAY OF LACTIC ACID: CPT | Performed by: NURSE PRACTITIONER

## 2021-07-15 PROCEDURE — 25010000002 VANCOMYCIN 1 G RECONSTITUTED SOLUTION 1 EACH VIAL: Performed by: NURSE PRACTITIONER

## 2021-07-15 PROCEDURE — 99226 PR SBSQ OBSERVATION CARE/DAY 35 MINUTES: CPT | Performed by: INTERNAL MEDICINE

## 2021-07-15 PROCEDURE — 25010000002 CEFTRIAXONE PER 250 MG: Performed by: NURSE PRACTITIONER

## 2021-07-15 PROCEDURE — 85025 COMPLETE CBC W/AUTO DIFF WBC: CPT | Performed by: NURSE PRACTITIONER

## 2021-07-15 PROCEDURE — 83735 ASSAY OF MAGNESIUM: CPT | Performed by: NURSE PRACTITIONER

## 2021-07-15 PROCEDURE — 25010000002 ENOXAPARIN PER 10 MG: Performed by: NURSE PRACTITIONER

## 2021-07-15 PROCEDURE — 36415 COLL VENOUS BLD VENIPUNCTURE: CPT | Performed by: NURSE PRACTITIONER

## 2021-07-15 RX ADMIN — VANCOMYCIN HYDROCHLORIDE 1000 MG: 1 INJECTION, POWDER, LYOPHILIZED, FOR SOLUTION INTRAVENOUS at 20:40

## 2021-07-15 RX ADMIN — CEFTRIAXONE SODIUM 1 G: 1 INJECTION, POWDER, FOR SOLUTION INTRAMUSCULAR; INTRAVENOUS at 22:00

## 2021-07-15 RX ADMIN — VANCOMYCIN HYDROCHLORIDE 1000 MG: 1 INJECTION, POWDER, LYOPHILIZED, FOR SOLUTION INTRAVENOUS at 08:02

## 2021-07-15 RX ADMIN — Medication 10 ML: at 08:01

## 2021-07-15 RX ADMIN — SODIUM CHLORIDE 75 ML/HR: 9 INJECTION, SOLUTION INTRAVENOUS at 22:18

## 2021-07-15 RX ADMIN — ENOXAPARIN SODIUM 40 MG: 40 INJECTION SUBCUTANEOUS at 15:17

## 2021-07-15 RX ADMIN — ACETAMINOPHEN 650 MG: 325 TABLET ORAL at 23:49

## 2021-07-15 NOTE — CASE MANAGEMENT/SOCIAL WORK
Discharge Planning Assessment   [facility]     Patient Name: Boby Samson  MRN: [id number]  Today's Date: 7/15/2021    Admit Date: 7/14/2021    Discharge Needs Assessment     Row Name 07/15/21 8244       Living Environment    Lives With  other (see comments) family    Current Living Arrangements  home/apartment/condo    Primary Care Provided by  self    Provides Primary Care For  no one    Able to Return to Prior Arrangements  yes       Resource/Environmental Concerns    Resource/Environmental Concerns  none    Transportation Concerns  car, none       Transition Planning    Patient/Family Anticipates Transition to  home with family    Patient/Family Anticipated Services at Transition  none    Transportation Anticipated  family or friend will provide       Discharge Needs Assessment    Readmission Within the Last 30 Days  no previous admission in last 30 days    Equipment Currently Used at Home  none    Concerns to be Addressed  denies needs/concerns at this time;no discharge needs identified    Anticipated Changes Related to Illness  none    Equipment Needed After Discharge  none    Provided Post Acute Provider List?  N/A        Discharge Plan     Row Name 07/15/21 0750       Plan    Plan  Anticipate routine home    Patient/Family in Agreement with Plan  yes    Plan Comments  Met with patient at bedside, reports he is I with ADLs, still drives. Will drive self home from the hospital. Does not have a PCP, insurance card not scanned into EPIC. Patient declines CM assistance to arrange a new patient appointment on d/c. Pharmacy confirmed. Denies any d/c needs or concerns. DC barriers: IV abx, IVF          Expected Discharge Date and Time     Expected Discharge Date Expected Discharge Time    Jul 16, 2021         Demographic Summary     Row Name 07/15/21 1425       General Information    Admission Type  observation    Referral Source  admission list    Reason for Consult  discharge planning    Preferred Language   English        Functional Status     Row Name 07/15/21 1430       Functional Status    Usual Activity Tolerance  good    Current Activity Tolerance  good       Functional Status, IADL    Medications  independent    Meal Preparation  independent    Housekeeping  independent    Laundry  independent    Shopping  independent        Met with patient in room wearing PPE: mask    Maintained distance greater than six feet and spent less than 15 minutes in the room.              Laura Baez RN

## 2021-07-15 NOTE — PROGRESS NOTES
"Pharmacy Antimicrobial Dosing Service    Subjective:  Boby Samson is a 50 y.o.male admitted with low extremity edema. Pharmacy has been consulted to dose Vancomycin for possible SSTI.      Assessment/Plan    1. Day #1 Vancomycin: Goal -600 mcg*h/mL. Vancomycin 1500 mg (~20 mg/kg ABW) IV once, followed by 1000 mg (~15 mg/kg DBW) IV q12h. Obtain peak on 7/16 at 0000 and trough on 7/16 at 0700, or earlier if clinically warranted.     2. Day #1 Ceftriaxone: 1gm IV q24h    Will continue to monitor drug levels, renal function, culture and sensitivities, and patient clinical status.       Objective:  Relevant clinical data and objective history reviewed:  167.6 cm (66\")   77.1 kg (170 lb)   Ideal body weight: 63.8 kg (140 lb 10.5 oz)  Adjusted ideal body weight: 69.1 kg (152 lb 6.3 oz)  Body mass index is 27.44 kg/m².        Results from last 7 days   Lab Units 07/14/21  1743   CREATININE mg/dL 1.14     Estimated Creatinine Clearance: 76.6 mL/min (by C-G formula based on SCr of 1.14 mg/dL).  No intake/output data recorded.    Results from last 7 days   Lab Units 07/14/21  1743   WBC 10*3/mm3 7.30     Temperature    07/14/21 1455 07/14/21 1951   Temp: 97.9 °F (36.6 °C) 99 °F (37.2 °C)     Baseline culture/source/susceptibility:  Microbiology Results (last 10 days)       ** No results found for the last 240 hours. **              Anti-Infectives (From admission, onward)      Ordered     Dose/Rate Route Frequency Start Stop    07/14/21 2107  vancomycin (VANCOCIN) 1,000 mg in sodium chloride 0.9 % 250 mL IVPB     Ordering Provider: Tessa Cedeno APRN    15 mg/kg × 69.1 kg (Adjusted) Intravenous Every 12 Hours 07/15/21 0800 07/22/21 0759    07/14/21 2046  cefTRIAXone (ROCEPHIN) 1 g in sodium chloride 0.9 % 100 mL IVPB     Ordering Provider: Tessa Cedeno APRN    1 g  200 mL/hr over 30 Minutes Intravenous Every 24 Hours 07/14/21 2100 07/21/21 2105    07/14/21 2046  Pharmacy to dose vancomycin     Ordering " Provider: Tessa Cedeno APRN     Does not apply Continuous PRN 07/14/21 2046 07/21/21 2045 07/14/21 1911  vancomycin 1500 mg/500 mL 0.9% NS IVPB ([facility])     Ordering Provider: Joan Wagner APRN    20 mg/kg × 77.1 kg Intravenous Once 07/14/21 1913 07/14/21 1948 07/14/21 1759  clindamycin (CLEOCIN) 300 MG capsule     Ordering Provider: Provider, MD Rhys    300 mg Oral 4 Times Daily 07/13/21 0000 07/24/21 6757            Jami Mendenhall, PharmD  07/14/21 21:08 EDT'

## 2021-07-15 NOTE — PROGRESS NOTES
[facility] Medicine Services Daily Progress Note    Patient Name: Boby Samson  : 1970  MRN: [id number]  Primary Care Physician:  Provider, No Known  Date of admission: 2021      Subjective      Chief Complaint:  F/u LLE cellulitis       Patient Reports improving erythema to LLE and improving pain.   Patient does admit to smoking meth but denies any IV drug use.     Review of Systems   Constitutional: Negative for chills and fever.   Cardiovascular: Negative for chest pain and palpitations.   Respiratory: Negative for cough and shortness of breath.    Skin: Positive for rash.   Musculoskeletal: Positive for joint pain and joint swelling.         Objective      Vitals:   Temp:  [97.4 °F (36.3 °C)-99 °F (37.2 °C)] 97.4 °F (36.3 °C)  Heart Rate:  [] 82  Resp:  [16] 16  BP: (118-135)/(58-74) 135/58    Physical Exam  Vitals reviewed.   Constitutional:       General: He is not in acute distress.     Appearance: He is overweight.   HENT:      Head: Normocephalic and atraumatic.      Mouth/Throat:      Mouth: Mucous membranes are moist.   Cardiovascular:      Rate and Rhythm: Normal rate and regular rhythm.      Heart sounds: No murmur heard.     Pulmonary:      Effort: Pulmonary effort is normal. No respiratory distress.      Breath sounds: No wheezing or rales.   Abdominal:      General: Bowel sounds are normal. There is no distension.      Palpations: Abdomen is soft.      Tenderness: There is no abdominal tenderness.   Skin:     General: Skin is warm and dry.      Comments: Erythema to Left foot extending up past the medial ankle    Neurological:      Mental Status: He is alert. Mental status is at baseline.      Cranial Nerves: No cranial nerve deficit.   Psychiatric:         Mood and Affect: Mood normal.         Behavior: Behavior normal.               Result Review    Result Review:  I have personally reviewed the results from the time of this admission to 7/15/2021 18:02  EDT and agree with these findings:  [x]  Laboratory  [x]  Microbiology  [x]  Radiology  []  EKG/Telemetry   []  Cardiology/Vascular   []  Pathology  []  Old records  []  Other:  Most notable findings include:   CRP 8.6, Procal 0.46         Assessment/Plan      Brief Patient Summary:  oBby Samson is a 50 y.o. male with no significant PMH who presents to [facility] ED due to left lower extremity edema.  Patient states left lower extremity edema has progressively worsened over the last 7 days, pain is worse with palpation and exertion and relieved with rest.  Patient denies any other current symptoms.  Patient reports going to [facility] ED and receiving clindamycin which he has been taking for 2 days with no improvement so he decided to come to [facility] ED.     Upon arrival to the ED vitals temp 97 9, , RR 18, /72, O2 sat 96% on room air.  Labs notable for glucose 104, , K4.2, creatinine 1.14, BUN 16, ALT 19, AST 13, total bili 0.2, CRP 8.6, lactic 1.5, WBC 7.3, Hgb 14.3, neutrophils 68.2, sed rate 37.  XR left ankle shows diffuse soft tissue swelling. 2.6 mm osseous density at the distal tip of the lateral malleolus, likely an old fracture fragment. Recommend correlation with physical exam.  XR left foot shows there is diffuse soft tissue swelling which appears greatest over the forefoot. No acute fracture or dislocation is seen. There is a small plantar calcaneal spur. Probable old fracture fragment is seen at the distal tip of the lateral malleolus.  Patient treated in the ED with IV vancomycin.      cefTRIAXone, 1 g, Intravenous, Q24H  enoxaparin, 40 mg, Subcutaneous, Q24H  sodium chloride, 10 mL, Intravenous, Q12H  vancomycin, 15 mg/kg (Adjusted), Intravenous, Q12H       Pharmacy to dose vancomycin,   sodium chloride, 125 mL/hr, Last Rate: 125 mL/hr (07/14/21 2305)         Active Hospital Problems:  Active Hospital Problems    Diagnosis    • **Cellulitis of left lower  extremity    • Sepsis (CMS/HCC)      Plan:     Left lower extremity cellulitis  - Failed outpatient therapy after treatment at [facility] ED  - XR left ankle shows diffuse soft tissue swelling. 2.6 mm osseous density at the distal tip of the lateral malleolus, likely an old fracture fragment  - XR left foot shows there is diffuse soft tissue swelling which appears greatest over the forefoot. No acute fracture or dislocation is seen. There is a small plantar calcaneal spur. Probable old fracture fragment is seen at the distal tip of the lateral malleolus  - Patient treated in the ED with IV vancomycin  - blood cx- NGTD  - continue IV Vancomycin and Rocephin for now   - consider CT scan pending progress      Sepsis ruled out   -Fluid resuscitation with normal saline ordered per sepsis protocol  -Patient received vancomycin IVPB in the ED    Methamphetamine use  - denies any IVDA    DVT Prophylaxis  - enoxaparin     Pt is high risk                  DVT prophylaxis:  Medical DVT prophylaxis orders are present.    CODE STATUS:    Level Of Support Discussed With: Patient  Code Status: CPR  Medical Interventions (Level of Support Prior to Arrest): Full      Disposition:  I expect patient to be discharged home pending progress    This patient has been examined wearing appropriate Personal Protective Equipment. 07/15/21      Electronically signed by Winnie Mora DO, 07/15/21, 18:02 EDT.  [facility]ist [facility]

## 2021-07-16 ENCOUNTER — APPOINTMENT (OUTPATIENT)
Dept: CT IMAGING | Facility: HOSPITAL | Age: 51
End: 2021-07-16

## 2021-07-16 LAB
ANION GAP SERPL CALCULATED.3IONS-SCNC: 8 MMOL/L (ref 5–15)
BUN SERPL-MCNC: 11 MG/DL (ref 6–20)
BUN/CREAT SERPL: 9.6 (ref 7–25)
CALCIUM SPEC-SCNC: 8.4 MG/DL (ref 8.6–10.5)
CHLORIDE SERPL-SCNC: 106 MMOL/L (ref 98–107)
CO2 SERPL-SCNC: 25 MMOL/L (ref 22–29)
CREAT SERPL-MCNC: 1.14 MG/DL (ref 0.76–1.27)
GFR SERPL CREATININE-BSD FRML MDRD: 68 ML/MIN/1.73
GLUCOSE SERPL-MCNC: 102 MG/DL (ref 65–99)
MAGNESIUM SERPL-MCNC: 2.1 MG/DL (ref 1.6–2.6)
POTASSIUM SERPL-SCNC: 4.3 MMOL/L (ref 3.5–5.2)
SODIUM SERPL-SCNC: 139 MMOL/L (ref 136–145)
VANCOMYCIN PEAK SERPL-MCNC: 18.4 MCG/ML (ref 20–40)
VANCOMYCIN TROUGH SERPL-MCNC: 10.1 MCG/ML (ref 5–20)

## 2021-07-16 PROCEDURE — 83735 ASSAY OF MAGNESIUM: CPT | Performed by: NURSE PRACTITIONER

## 2021-07-16 PROCEDURE — 36415 COLL VENOUS BLD VENIPUNCTURE: CPT | Performed by: INTERNAL MEDICINE

## 2021-07-16 PROCEDURE — 25010000002 VANCOMYCIN PER 500 MG: Performed by: NURSE PRACTITIONER

## 2021-07-16 PROCEDURE — 80048 BASIC METABOLIC PNL TOTAL CA: CPT | Performed by: INTERNAL MEDICINE

## 2021-07-16 PROCEDURE — 80202 ASSAY OF VANCOMYCIN: CPT | Performed by: NURSE PRACTITIONER

## 2021-07-16 PROCEDURE — 25010000002 CEFTRIAXONE PER 250 MG: Performed by: NURSE PRACTITIONER

## 2021-07-16 PROCEDURE — 99232 SBSQ HOSP IP/OBS MODERATE 35: CPT | Performed by: INTERNAL MEDICINE

## 2021-07-16 RX ORDER — MECLIZINE HYDROCHLORIDE 25 MG/1
25 TABLET ORAL 3 TIMES DAILY PRN
Status: DISCONTINUED | OUTPATIENT
Start: 2021-07-16 | End: 2021-07-18 | Stop reason: HOSPADM

## 2021-07-16 RX ADMIN — CEFTRIAXONE SODIUM 1 G: 1 INJECTION, POWDER, FOR SOLUTION INTRAMUSCULAR; INTRAVENOUS at 23:04

## 2021-07-16 RX ADMIN — VANCOMYCIN HYDROCHLORIDE 1000 MG: 1 INJECTION, POWDER, LYOPHILIZED, FOR SOLUTION INTRAVENOUS at 21:03

## 2021-07-16 RX ADMIN — Medication 10 ML: at 21:19

## 2021-07-16 RX ADMIN — HYDROCODONE BITARTRATE AND ACETAMINOPHEN 1 TABLET: 7.5; 325 TABLET ORAL at 08:08

## 2021-07-16 RX ADMIN — SODIUM CHLORIDE 75 ML/HR: 9 INJECTION, SOLUTION INTRAVENOUS at 21:18

## 2021-07-16 RX ADMIN — VANCOMYCIN HYDROCHLORIDE 1000 MG: 1 INJECTION, POWDER, LYOPHILIZED, FOR SOLUTION INTRAVENOUS at 08:08

## 2021-07-16 NOTE — PROGRESS NOTES
"Pharmacy Antimicrobial Dosing Service    Subjective:  Boby Samson is a 50 y.o.male admitted with low extremity edema. Pharmacy has been consulted to dose Vancomycin for possible SSTI.      Assessment/Plan    1. Day #3 Vancomycin: Goal -600 mcg*h/mL.  Current regimen: 1000 mg (~15 mg/kg DBW) IV q12h   P/T today = 18.4 and 10.1 -- AUC ~ 430 mcg*h/mL    Will continue the same regimen with a projected AUC @ steady state~ 469 mcg*h/mL    2. Day #3 Ceftriaxone: 1gm IV q24h    Will continue to monitor drug levels, renal function, culture and sensitivities, and patient clinical status.       Objective:  Relevant clinical data and objective history reviewed:  167.6 cm (66\")   77.3 kg (170 lb 6.7 oz)   Ideal body weight: 63.8 kg (140 lb 10.5 oz)  Adjusted ideal body weight: 69.2 kg (152 lb 8.9 oz)  Body mass index is 27.51 kg/m².    Results from last 7 days   Lab Units 07/16/21  0635 07/16/21  0022   VANCOMYCIN PK mcg/mL  --  18.40*   VANCOMYCIN TR mcg/mL 10.10  --      Results from last 7 days   Lab Units 07/16/21  0022 07/15/21  0059 07/14/21  1743   CREATININE mg/dL 1.14 1.07 1.14     Estimated Creatinine Clearance: 76.7 mL/min (by C-G formula based on SCr of 1.14 mg/dL).  I/O last 3 completed shifts:  In: 2748 [P.O.:1200; I.V.:698; IV Piggyback:850]  Out: 1400 [Urine:1400]    Results from last 7 days   Lab Units 07/15/21  0059 07/14/21  1743   WBC 10*3/mm3 6.40 7.30     Temperature    07/15/21 2114 07/16/21 0006 07/16/21 0403   Temp: 98.8 °F (37.1 °C) 97.6 °F (36.4 °C) 97.8 °F (36.6 °C)     Baseline culture/source/susceptibility:  Microbiology Results (last 10 days)       Procedure Component Value - Date/Time    MRSA Screen, PCR (Inpatient) - Swab, Nares [[id number]]  (Normal) Collected: 07/14/21 2250    Lab Status: Final result Specimen: Swab from Nares Updated: 07/15/21 0771     MRSA PCR No MRSA Detected    COVID PRE-OP / PRE-PROCEDURE SCREENING ORDER (NO ISOLATION) - Swab, Nasopharynx [[id number]]  (Normal) " Collected: 07/14/21 2003    Lab Status: Final result Specimen: Swab from Nasopharynx Updated: 07/15/21 1504    Narrative:      The following orders were created for panel order COVID PRE-OP / PRE-PROCEDURE SCREENING ORDER (NO ISOLATION) - Swab, Nasopharynx.  Procedure                               Abnormality         Status                     ---------                               -----------         ------                     COVID-19,APTIMA PANTHER,...[[id number]]  Normal              Final result                 Please view results for these tests on the individual orders.    COVID-19,APTIMA PANTHER,J[facility]IE IN-HOUSE, NP/OP SWAB IN UTM/VTM/[facility] TRANSPORT MEDIA,24 HR TAT - Swab, Nasopharynx [[id number]]  (Normal) Collected: 07/14/21 2003    Lab Status: Final result Specimen: Swab from Nasopharynx Updated: 07/15/21 1504     COVID19 Not Detected    Narrative:      Fact sheet for providers: https://www.fda.gov/media/574820/download     Fact sheet for patients: https://www.fda.gov/media/057348/download    Test performed by RT PCR.    Blood Culture - Blood, Arm, Left [[id number]] Collected: 07/14/21 1743    Lab Status: Preliminary result Specimen: Blood from Arm, Left Updated: 07/15/21 1800     Blood Culture No growth at 24 hours    Blood Culture - Blood, Arm, Right [[id number]] Collected: 07/14/21 1743    Lab Status: Preliminary result Specimen: Blood from Arm, Right Updated: 07/15/21 1800     Blood Culture No growth at 24 hours              Anti-Infectives (From admission, onward)      Ordered     Dose/Rate Route Frequency Start Stop    07/14/21 2107  vancomycin (VANCOCIN) 1,000 mg in sodium chloride 0.9 % 250 mL IVPB     Ordering Provider: Tessa Cedeno APRN    15 mg/kg × 69.1 kg (Adjusted) Intravenous Every 12 Hours 07/15/21 0800 07/22/21 0759    07/14/21 2046  cefTRIAXone (ROCEPHIN) 1 g in sodium chloride 0.9 % 100 mL IVPB     Ordering Provider: Tessa Cedeno APRN    1 g  200 mL/hr over 30 Minutes Intravenous Every  24 Hours 07/14/21 2100 07/21/21 2105 07/14/21 2046  Pharmacy to dose vancomycin     Ordering Provider: Tessa Cedeno APRN     Does not apply Continuous PRN 07/14/21 2046 07/21/21 2045 07/14/21 1911  vancomycin 1500 mg/500 mL 0.9% NS IVPB ([facility])     Ordering Provider: Joan Wagner APRN    20 mg/kg × 77.1 kg Intravenous Once 07/14/21 1913 07/14/21 2245            Debbie Rice [facility]  07/16/21 12:57 EDT'

## 2021-07-16 NOTE — CASE MANAGEMENT/SOCIAL WORK
Continued Stay Note  [facility]     Patient Name: Boby Samson  MRN: [id number]  Today's Date: 7/16/2021    Admit Date: 7/14/2021    Discharge Plan     Row Name 07/16/21 1510       Plan    Plan  Anticipate routine home    Plan Comments  KATHY met with pt at bedside to discuss (+) UDS - amphetamines. Pt acknowledged amphetamine abuse (smokes meth) and stated he is interested in discontinuing use. SW reviewed treatment options (inpt 30 day program vs. IOP vs. OP) with pt. Pt responded that he is not inclined to complete a 30-day inpt treatment program at this time d/t his s/o having cancer and going through treatment at this time. Pt stated he needs to consider IOP vs OP at this time and is agreeable to  leaving list of resources and  business card.        Met with patient in room wearing PPE: mask, face shield/goggles.      Maintained distance greater than six feet and spent less than 15 minutes in the room.      OSCAR Caceres, LSW    Office: [phone]  Cell: [phone]  Fax: [phone]  E-mail: [email or web address]

## 2021-07-16 NOTE — PROGRESS NOTES
[facility] Medicine Services Daily Progress Note    Patient Name: Boby Samson  : 1970  MRN: [id number]  Primary Care Physician:  Provider, No Known  Date of admission: 2021      Subjective      Chief Complaint:  F/u LLE cellulitis       7/15- Patient Reports improving erythema to LLE and improving pain.   Patient does admit to smoking meth but denies any IV drug use.     - Patient complaining of some dizziness that started this am.      Review of Systems   Constitutional: Negative for chills and fever.   Cardiovascular: Negative for chest pain and palpitations.   Respiratory: Negative for cough and shortness of breath.    Skin: Positive for rash.   Musculoskeletal: Positive for joint swelling. Negative for joint pain.         Objective      Vitals:   Temp:  [97.6 °F (36.4 °C)-98.8 °F (37.1 °C)] 97.6 °F (36.4 °C)  Heart Rate:  [64-87] 80  Resp:  [14-16] 16  BP: (123-147)/(73-82) 147/82    Physical Exam  Vitals reviewed.   Constitutional:       General: He is not in acute distress.     Appearance: He is overweight.   HENT:      Head: Normocephalic and atraumatic.      Mouth/Throat:      Mouth: Mucous membranes are moist.   Cardiovascular:      Rate and Rhythm: Normal rate and regular rhythm.      Heart sounds: No murmur heard.     Pulmonary:      Effort: Pulmonary effort is normal. No respiratory distress.      Breath sounds: No wheezing or rales.   Abdominal:      General: Bowel sounds are normal. There is no distension.      Palpations: Abdomen is soft.      Tenderness: There is no abdominal tenderness.   Skin:     General: Skin is warm and dry.      Comments: Erythema to Left foot extending up past the medial ankle improving   Neurological:      Mental Status: He is alert. Mental status is at baseline.      Cranial Nerves: No cranial nerve deficit.   Psychiatric:         Mood and Affect: Mood normal.         Behavior: Behavior normal.               Result Review    Result  Review:  I have personally reviewed the results from the time of this admission to 7/16/2021 16:06 EDT and agree with these findings:  [x]  Laboratory  [x]  Microbiology  [x]  Radiology  []  EKG/Telemetry   []  Cardiology/Vascular   []  Pathology  []  Old records  []  Other:  Most notable findings include:   CRP 8.6, Procal 0.46         cefTRIAXone, 1 g, Intravenous, Q24H  enoxaparin, 40 mg, Subcutaneous, Q24H  sodium chloride, 10 mL, Intravenous, Q12H  vancomycin, 15 mg/kg (Adjusted), Intravenous, Q12H       Pharmacy to dose vancomycin,   sodium chloride, 75 mL/hr, Last Rate: 75 mL/hr (07/15/21 2218)           Assessment/Plan      Brief Patient Summary:  Boby Samson is a 50 y.o. male with no significant PMH who presents to [facility] ED due to left lower extremity edema.  Patient states left lower extremity edema has progressively worsened over the last 7 days, pain is worse with palpation and exertion and relieved with rest.  Patient denies any other current symptoms.  Patient reports going to [facility] ED and receiving clindamycin which he has been taking for 2 days with no improvement so he decided to come to [facility] ED.     Upon arrival to the ED vitals temp 97 9, , RR 18, /72, O2 sat 96% on room air.  Labs notable for glucose 104, , K4.2, creatinine 1.14, BUN 16, ALT 19, AST 13, total bili 0.2, CRP 8.6, lactic 1.5, WBC 7.3, Hgb 14.3, neutrophils 68.2, sed rate 37.  XR left ankle shows diffuse soft tissue swelling. 2.6 mm osseous density at the distal tip of the lateral malleolus, likely an old fracture fragment. Recommend correlation with physical exam.  XR left foot shows there is diffuse soft tissue swelling which appears greatest over the forefoot. No acute fracture or dislocation is seen. There is a small plantar calcaneal spur. Probable old fracture fragment is seen at the distal tip of the lateral malleolus.  Patient treated in the ED with IV  vancomycin.        Active Hospital Problems:  Active Hospital Problems    Diagnosis    • **Cellulitis of left lower extremity    • Methamphetamine abuse (CMS/[facility])      Plan:     Left lower extremity cellulitis  - Failed outpatient therapy after treatment at [facility] ED  - XR left ankle shows diffuse soft tissue swelling. 2.6 mm osseous density at the distal tip of the lateral malleolus, likely an old fracture fragment  - XR left foot shows there is diffuse soft tissue swelling which appears greatest over the forefoot. No acute fracture or dislocation is seen. There is a small plantar calcaneal spur. Probable old fracture fragment is seen at the distal tip of the lateral malleolus  - Patient treated in the ED with IV vancomycin  - blood cx- NGTD  - continue IV Vancomycin and Rocephin for now   - check CT    Dizziness  - check orthostatic vital signs   - add meclizine prn      Sepsis ruled out   - Fluid resuscitation with normal saline ordered per sepsis protocol  - Patient received vancomycin IVPB in the ED    Methamphetamine use  - denies any IVDA    DVT Prophylaxis  - enoxaparin     Pt is high risk                  DVT prophylaxis:  Medical DVT prophylaxis orders are present.    CODE STATUS:    Level Of Support Discussed With: Patient  Code Status: CPR  Medical Interventions (Level of Support Prior to Arrest): Full      Disposition:  I expect patient to be discharged home pending progress    This patient has been examined wearing appropriate Personal Protective Equipment. 07/16/21      Electronically signed by Winnie Mora DO, 07/16/21, 16:06 EDT.  [facility]ist [facility]

## 2021-07-16 NOTE — PLAN OF CARE
Problem: Skin Injury Risk Increased  Goal: Skin Health and Integrity  Intervention: Optimize Skin Protection  Recent Flowsheet Documentation  Taken 7/16/2021 0102 by Alek Looney LPN  Head of Bed (HOB): HOB at 30 degrees  Taken 7/15/2021 2304 by Alek Looney LPN  Head of Bed (HOB): HOB at 20-30 degrees  Taken 7/15/2021 2100 by Alek Looney LPN  Head of Bed (HOB): HOB at 20-30 degrees  Taken 7/15/2021 1955 by Alek Looney LPN  Head of Bed (HOB): HOB at 20-30 degrees     Problem: Pain Acute  Goal: Optimal Pain Control  Intervention: Develop Pain Management Plan  Recent Flowsheet Documentation  Taken 7/15/2021 2349 by Alek Looney LPN  Pain Management Interventions:   see MAR   quiet environment facilitated   position adjusted   medication offered but refused   pain management plan reviewed with patient/caregiver  Taken 7/15/2021 2304 by Alek Looney LPN  Pain Management Interventions:   see MAR   quiet environment facilitated   pillow support provided   medication offered but refused  Taken 7/15/2021 1955 by Alek Looney LPN  Pain Management Interventions:   see MAR   quiet environment facilitated   position adjusted   pain management plan reviewed with patient/caregiver  Intervention: Prevent or Manage Pain  Recent Flowsheet Documentation  Taken 7/16/2021 0019 by Alek Looney LPN  Sleep/Rest Enhancement:   awakenings minimized   consistent schedule promoted   natural light exposure provided   noise level reduced   regular sleep/rest pattern promoted  Intervention: Optimize Psychosocial Wellbeing  Recent Flowsheet Documentation  Taken 7/15/2021 1955 by Alek Looney LPN  Supportive Measures: active listening utilized  Diversional Activities:   smartphone   television     Problem: Adult Inpatient Plan of Care  Goal: Absence of Hospital-Acquired Illness or Injury  Intervention: Identify and Manage Fall Risk  Recent Flowsheet Documentation  Taken  7/16/2021 0102 by Alek Looney LPN  Safety Promotion/Fall Prevention:   safety round/check completed   room organization consistent   nonskid shoes/slippers when out of bed   muscle strengthening facilitated   mobility aid in reach   lighting adjusted   activity supervised   assistive device/personal items within reach   clutter free environment maintained  Taken 7/15/2021 2304 by Alek Looney LPN  Safety Promotion/Fall Prevention:   safety round/check completed   room organization consistent   nonskid shoes/slippers when out of bed   mobility aid in reach   muscle strengthening facilitated   lighting adjusted  Taken 7/15/2021 2100 by Alek Looney LPN  Safety Promotion/Fall Prevention:   safety round/check completed   nonskid shoes/slippers when out of bed   muscle strengthening facilitated   mobility aid in reach   lighting adjusted   fall prevention program maintained   activity supervised   assistive device/personal items within reach   clutter free environment maintained  Taken 7/15/2021 1955 by Alek Looney LPN  Safety Promotion/Fall Prevention:   safety round/check completed   room organization consistent   nonskid shoes/slippers when out of bed   muscle strengthening facilitated   mobility aid in reach   lighting adjusted   activity supervised   assistive device/personal items within reach   clutter free environment maintained   fall prevention program maintained  Intervention: Prevent Skin Injury  Recent Flowsheet Documentation  Taken 7/16/2021 0102 by Alek Looney LPN  Body Position: position changed independently  Taken 7/15/2021 2304 by Alek Looney LPN  Body Position: position changed independently  Taken 7/15/2021 2100 by Alek Looney LPN  Body Position: position changed independently  Taken 7/15/2021 1955 by Alek Looney LPN  Body Position: position changed independently  Intervention: Prevent Infection  Recent Flowsheet  Documentation  Taken 7/16/2021 0102 by Alek Looney LPN  Infection Prevention:   visitors restricted/screened   single patient room provided   personal protective equipment utilized   rest/sleep promoted   hand hygiene promoted  Taken 7/15/2021 2304 by Alek Looney LPN  Infection Prevention:   visitors restricted/screened   single patient room provided   rest/sleep promoted   personal protective equipment utilized   hand hygiene promoted  Taken 7/15/2021 2100 by Alek Looney LPN  Infection Prevention:   visitors restricted/screened   single patient room provided   rest/sleep promoted   personal protective equipment utilized   hand hygiene promoted  Taken 7/15/2021 1955 by Alek Looney LPN  Infection Prevention:   visitors restricted/screened   single patient room provided   rest/sleep promoted   personal protective equipment utilized   hand hygiene promoted   Goal Outcome Evaluation:

## 2021-07-16 NOTE — PLAN OF CARE
Goal Outcome Evaluation:              Outcome Summary: Patient cellulities of LLE is improving. LLE is still warm, tender on the top, reddness has improved. Currently on IV Vancomycin and Rocephin. No complaints at this time. Will continue to monitor.

## 2021-07-17 ENCOUNTER — APPOINTMENT (OUTPATIENT)
Dept: CT IMAGING | Facility: HOSPITAL | Age: 51
End: 2021-07-17

## 2021-07-17 LAB
ANION GAP SERPL CALCULATED.3IONS-SCNC: 10 MMOL/L (ref 5–15)
BASOPHILS # BLD AUTO: 0.1 10*3/MM3 (ref 0–0.2)
BASOPHILS NFR BLD AUTO: 1.1 % (ref 0–1.5)
BUN SERPL-MCNC: 12 MG/DL (ref 6–20)
BUN/CREAT SERPL: 14.3 (ref 7–25)
CALCIUM SPEC-SCNC: 9.1 MG/DL (ref 8.6–10.5)
CHLORIDE SERPL-SCNC: 105 MMOL/L (ref 98–107)
CO2 SERPL-SCNC: 27 MMOL/L (ref 22–29)
CREAT SERPL-MCNC: 0.84 MG/DL (ref 0.76–1.27)
DEPRECATED RDW RBC AUTO: 42.9 FL (ref 37–54)
EOSINOPHIL # BLD AUTO: 0.2 10*3/MM3 (ref 0–0.4)
EOSINOPHIL NFR BLD AUTO: 3.7 % (ref 0.3–6.2)
ERYTHROCYTE [DISTWIDTH] IN BLOOD BY AUTOMATED COUNT: 13.5 % (ref 12.3–15.4)
GFR SERPL CREATININE-BSD FRML MDRD: 97 ML/MIN/1.73
GLUCOSE SERPL-MCNC: 98 MG/DL (ref 65–99)
HCT VFR BLD AUTO: 40.1 % (ref 37.5–51)
HGB BLD-MCNC: 13.7 G/DL (ref 13–17.7)
LYMPHOCYTES # BLD AUTO: 2.1 10*3/MM3 (ref 0.7–3.1)
LYMPHOCYTES NFR BLD AUTO: 33.3 % (ref 19.6–45.3)
MAGNESIUM SERPL-MCNC: 1.9 MG/DL (ref 1.6–2.6)
MCH RBC QN AUTO: 31.3 PG (ref 26.6–33)
MCHC RBC AUTO-ENTMCNC: 34.1 G/DL (ref 31.5–35.7)
MCV RBC AUTO: 91.7 FL (ref 79–97)
MONOCYTES # BLD AUTO: 0.4 10*3/MM3 (ref 0.1–0.9)
MONOCYTES NFR BLD AUTO: 6.7 % (ref 5–12)
NEUTROPHILS NFR BLD AUTO: 3.5 10*3/MM3 (ref 1.7–7)
NEUTROPHILS NFR BLD AUTO: 55.2 % (ref 42.7–76)
NRBC BLD AUTO-RTO: 0.1 /100 WBC (ref 0–0.2)
PLATELET # BLD AUTO: 153 10*3/MM3 (ref 140–450)
PMV BLD AUTO: 10.4 FL (ref 6–12)
POTASSIUM SERPL-SCNC: 4.4 MMOL/L (ref 3.5–5.2)
RBC # BLD AUTO: 4.38 10*6/MM3 (ref 4.14–5.8)
SODIUM SERPL-SCNC: 142 MMOL/L (ref 136–145)
WBC # BLD AUTO: 6.3 10*3/MM3 (ref 3.4–10.8)

## 2021-07-17 PROCEDURE — 80048 BASIC METABOLIC PNL TOTAL CA: CPT | Performed by: INTERNAL MEDICINE

## 2021-07-17 PROCEDURE — 36415 COLL VENOUS BLD VENIPUNCTURE: CPT | Performed by: INTERNAL MEDICINE

## 2021-07-17 PROCEDURE — 25010000002 VANCOMYCIN: Performed by: INTERNAL MEDICINE

## 2021-07-17 PROCEDURE — 83735 ASSAY OF MAGNESIUM: CPT | Performed by: NURSE PRACTITIONER

## 2021-07-17 PROCEDURE — 25010000002 VANCOMYCIN PER 500 MG: Performed by: NURSE PRACTITIONER

## 2021-07-17 PROCEDURE — 73701 CT LOWER EXTREMITY W/DYE: CPT

## 2021-07-17 PROCEDURE — 0 IOPAMIDOL PER 1 ML: Performed by: INTERNAL MEDICINE

## 2021-07-17 PROCEDURE — 85025 COMPLETE CBC W/AUTO DIFF WBC: CPT | Performed by: INTERNAL MEDICINE

## 2021-07-17 PROCEDURE — 25010000002 ENOXAPARIN PER 10 MG: Performed by: NURSE PRACTITIONER

## 2021-07-17 PROCEDURE — 25010000002 CEFTRIAXONE PER 250 MG: Performed by: NURSE PRACTITIONER

## 2021-07-17 PROCEDURE — 99232 SBSQ HOSP IP/OBS MODERATE 35: CPT | Performed by: INTERNAL MEDICINE

## 2021-07-17 RX ADMIN — ENOXAPARIN SODIUM 40 MG: 40 INJECTION SUBCUTANEOUS at 16:29

## 2021-07-17 RX ADMIN — VANCOMYCIN HYDROCHLORIDE 1250 MG: 10 INJECTION, POWDER, LYOPHILIZED, FOR SOLUTION INTRAVENOUS at 20:07

## 2021-07-17 RX ADMIN — ACETAMINOPHEN 650 MG: 325 TABLET ORAL at 20:06

## 2021-07-17 RX ADMIN — IOPAMIDOL 100 ML: 755 INJECTION, SOLUTION INTRAVENOUS at 11:00

## 2021-07-17 RX ADMIN — Medication 10 ML: at 20:07

## 2021-07-17 RX ADMIN — MECLIZINE HYDROCLORIDE 25 MG: 25 TABLET ORAL at 12:15

## 2021-07-17 RX ADMIN — CEFTRIAXONE SODIUM 1 G: 1 INJECTION, POWDER, FOR SOLUTION INTRAMUSCULAR; INTRAVENOUS at 20:07

## 2021-07-17 RX ADMIN — Medication 10 ML: at 07:44

## 2021-07-17 RX ADMIN — VANCOMYCIN HYDROCHLORIDE 1000 MG: 1 INJECTION, POWDER, LYOPHILIZED, FOR SOLUTION INTRAVENOUS at 07:43

## 2021-07-17 NOTE — PROGRESS NOTES
"Pharmacy Antimicrobial Dosing Service    Subjective:  Boby Samson is a 50 y.o.male admitted with low extremity edema. Pharmacy has been consulted to dose Vancomycin for possible SSTI.      Assessment/Plan    1. Day #4 Vancomycin: Goal -600 mcg*h/mL. Patient currently receiving 1000 mg (~13 mg/kg ABW) IV q12h with a previously therapeutic AUC of 430 mcg*h/mL. Patient renal function has significantly improved and now [employer] is predicting a subtherapeutic AUC. Will preemptively increase dose to 1250 mg (16 mg/kg ABW) IV q12h for estimated AUC ~458. Plan to obtain levels prior to 5th dose of new regimen.     2. Day #4 Ceftriaxone: 1gm IV q24h    Will continue to monitor drug levels, renal function, culture and sensitivities, and patient clinical status.       Objective:  Relevant clinical data and objective history reviewed:  167.6 cm (66\")   77.3 kg (170 lb 6.7 oz)   Ideal body weight: 63.8 kg (140 lb 10.5 oz)  Adjusted ideal body weight: 69.2 kg (152 lb 8.9 oz)  Body mass index is 27.51 kg/m².    Results from last 7 days   Lab Units 07/16/21  0635 07/16/21  0022   VANCOMYCIN PK mcg/mL  --  18.40*   VANCOMYCIN TR mcg/mL 10.10  --      Results from last 7 days   Lab Units 07/17/21  0204 07/16/21  0022 07/15/21  0059   CREATININE mg/dL 0.84 1.14 1.07     Estimated Creatinine Clearance: 104.1 mL/min (by C-G formula based on SCr of 0.84 mg/dL).  I/O last 3 completed shifts:  In: 3003 [P.O.:1080; I.V.:1223; IV Piggyback:700]  Out: 800 [Urine:800]    Results from last 7 days   Lab Units 07/17/21  0204 07/15/21  0059 07/14/21  1743   WBC 10*3/mm3 6.30 6.40 7.30     Temperature    07/16/21 1913 07/17/21 0348 07/17/21 1218   Temp: 97.5 °F (36.4 °C) 97.7 °F (36.5 °C) 97.3 °F (36.3 °C)     Baseline culture/source/susceptibility:  Microbiology Results (last 10 days)       Procedure Component Value - Date/Time    MRSA Screen, PCR (Inpatient) - Swab, Nares [[id number]]  (Normal) Collected: 07/14/21 5597    Lab Status: " Final result Specimen: Swab from Nares Updated: 07/15/21 0745     MRSA PCR No MRSA Detected    COVID PRE-OP / PRE-PROCEDURE SCREENING ORDER (NO ISOLATION) - Swab, Nasopharynx [[id number]]  (Normal) Collected: 07/14/21 2003    Lab Status: Final result Specimen: Swab from Nasopharynx Updated: 07/15/21 1504    Narrative:      The following orders were created for panel order COVID PRE-OP / PRE-PROCEDURE SCREENING ORDER (NO ISOLATION) - Swab, Nasopharynx.  Procedure                               Abnormality         Status                     ---------                               -----------         ------                     COVID-19,APTIMA PANTHER,...[[id number]]  Normal              Final result                 Please view results for these tests on the individual orders.    COVID-19,APTIMA PANTHER,J[facility]IE IN-HOUSE, NP/OP SWAB IN UTM/VTM/[facility] TRANSPORT MEDIA,24 HR TAT - Swab, Nasopharynx [[id number]]  (Normal) Collected: 07/14/21 2003    Lab Status: Final result Specimen: Swab from Nasopharynx Updated: 07/15/21 1504     COVID19 Not Detected    Narrative:      Fact sheet for providers: https://www.fda.gov/media/573320/download     Fact sheet for patients: https://www.fda.gov/media/807881/download    Test performed by RT PCR.    Blood Culture - Blood, Arm, Left [[id number]] Collected: 07/14/21 1743    Lab Status: Preliminary result Specimen: Blood from Arm, Left Updated: 07/16/21 1800     Blood Culture No growth at 2 days    Blood Culture - Blood, Arm, Right [[id number]] Collected: 07/14/21 1743    Lab Status: Preliminary result Specimen: Blood from Arm, Right Updated: 07/16/21 1800     Blood Culture No growth at 2 days              Anti-Infectives (From admission, onward)      Ordered     Dose/Rate Route Frequency Start Stop    07/17/21 1654  vancomycin 1250 mg/250 mL 0.9% NS IVPB ([facility])     Ordering Provider: Winnie Mora DO    1,250 mg Intravenous Every 12 Hours 07/17/21 2000 07/22/21 0759    07/14/21 2046   cefTRIAXone (ROCEPHIN) 1 g in sodium chloride 0.9 % 100 mL IVPB     Ordering Provider: Tessa Cedeno APRN    1 g  200 mL/hr over 30 Minutes Intravenous Every 24 Hours 07/14/21 2100 07/21/21 2105 07/14/21 2046  Pharmacy to dose vancomycin     Ordering Provider: Tessa Cedeno APRN     Does not apply Continuous PRN 07/14/21 2046 07/21/21 2045 07/14/21 1911  vancomycin 1500 mg/500 mL 0.9% NS IVPB ([facility])     Ordering Provider: Joan Wagner APRN    20 mg/kg × 77.1 kg Intravenous Once 07/14/21 1913 07/14/21 2245            Kimi Cohen RPH  07/17/21 16:56 EDT'

## 2021-07-17 NOTE — PLAN OF CARE
Goal Outcome Evaluation:  Plan of Care Reviewed With: patient        Progress: improving  Outcome Summary: Patient in bed resting. CT of left foot completed.

## 2021-07-17 NOTE — PLAN OF CARE
Problem: Skin Injury Risk Increased  Goal: Skin Health and Integrity  Intervention: Optimize Skin Protection  Recent Flowsheet Documentation  Taken 7/17/2021 0300 by Sanjuanita Moss RN  Head of Bed (HOB): HOB elevated  Taken 7/17/2021 0100 by Sanjuanita Moss RN  Head of Bed (HOB): HOB elevated  Taken 7/16/2021 2307 by Sanjuanita Moss RN  Head of Bed (HOB): HOB elevated  Taken 7/16/2021 2119 by Sanjuanita Moss RN  Head of Bed (HOB): HOB elevated  Taken 7/16/2021 1901 by Sanjuanita Moss RN  Pressure Reduction Techniques: frequent weight shift encouraged  Head of Bed (HOB): HOB elevated  Pressure Reduction Devices:   pressure-redistributing mattress utilized   positioning supports utilized  Skin Protection:   adhesive use limited   tubing/devices free from skin contact   Goal Outcome Evaluation:           Progress: improving  Outcome Summary: Patient has slept well through the night with no complaints. IV abx administered as scheduled. Will have CT LLE today.

## 2021-07-17 NOTE — PROGRESS NOTES
[facility] Medicine Services Daily Progress Note    Patient Name: Boby Samson  : 1970  MRN: [id number]  Primary Care Physician:  Provider, No Known  Date of admission: 2021      Subjective      Chief Complaint:  F/u LLE cellulitis       7/15- Patient Reports improving erythema to LLE and improving pain.   Patient does admit to smoking meth but denies any IV drug use.     - Patient complaining of some dizziness that started this am.    - Patient reports his LLE erythema and swelling are improving. He still has some dizziness and nausea.         Review of Systems   Constitutional: Negative for chills and fever.   Cardiovascular: Negative for chest pain and palpitations.   Respiratory: Negative for cough and shortness of breath.    Gastrointestinal: Positive for nausea.   Neurological: Positive for dizziness.         Objective      Vitals:   Temp:  [97.3 °F (36.3 °C)-97.7 °F (36.5 °C)] 97.3 °F (36.3 °C)  Heart Rate:  [66-79] 68  Resp:  [16-18] 18  BP: (123-153)/(73-92) 124/79    Physical Exam  Vitals reviewed.   Constitutional:       General: He is not in acute distress.     Appearance: He is overweight.   HENT:      Head: Normocephalic and atraumatic.      Mouth/Throat:      Mouth: Mucous membranes are moist.   Cardiovascular:      Rate and Rhythm: Normal rate and regular rhythm.      Heart sounds: No murmur heard.     Pulmonary:      Effort: Pulmonary effort is normal. No respiratory distress.      Breath sounds: No wheezing or rales.   Abdominal:      General: Bowel sounds are normal. There is no distension.      Palpations: Abdomen is soft.      Tenderness: There is no abdominal tenderness.   Musculoskeletal:      Left lower leg: Edema present.   Skin:     General: Skin is warm and dry.      Comments: Erythema to Left foot extending up past the medial ankle continues to improve    Neurological:      Mental Status: He is alert. Mental status is at baseline.      Cranial  Nerves: No cranial nerve deficit.      Motor: No weakness.   Psychiatric:         Mood and Affect: Mood normal.         Behavior: Behavior normal.               Result Review    Result Review:  I have personally reviewed the results from the time of this admission to 7/17/2021 16:05 EDT and agree with these findings:  [x]  Laboratory  [x]  Microbiology  [x]  Radiology  []  EKG/Telemetry   []  Cardiology/Vascular   []  Pathology  []  Old records  []  Other:  Most notable findings include:     CT Lower Extremity Left With Contrast    Result Date: 7/17/2021  1.Diffuse soft tissue edema of the visualized lower extremity, most prominent along the anterior medial aspect of the ankle and dorsum of the foot. This is nonspecific but may represent skin and soft tissue infection. 2.No definite collection is seen at this time to indicate abscess. 3.No definite findings of acute osseous abnormality. MRI would be more sensitive for acute osteomyelitis.  Electronically Signed By-Chris Oliver MD On:7/17/2021 3:47 PM This report was finalized on [id number] by  Chris Oliver MD.        cefTRIAXone, 1 g, Intravenous, Q24H  enoxaparin, 40 mg, Subcutaneous, Q24H  sodium chloride, 10 mL, Intravenous, Q12H  vancomycin, 15 mg/kg (Adjusted), Intravenous, Q12H       Pharmacy to dose vancomycin,   sodium chloride, 75 mL/hr, Last Rate: 75 mL/hr (07/15/21 2211)           Assessment/Plan      Brief Patient Summary:  Boby Samson is a 50 y.o. male with no significant PMH who presents to [facility] ED due to left lower extremity edema.  Patient states left lower extremity edema has progressively worsened over the last 7 days, pain is worse with palpation and exertion and relieved with rest.  Patient denies any other current symptoms.  Patient reports going to [facility] ED and receiving clindamycin which he has been taking for 2 days with no improvement so he decided to come to [facility] ED.     Upon arrival to the ED vitals  temp 97 9, , RR 18, /72, O2 sat 96% on room air.  Labs notable for glucose 104, , K4.2, creatinine 1.14, BUN 16, ALT 19, AST 13, total bili 0.2, CRP 8.6, lactic 1.5, WBC 7.3, Hgb 14.3, neutrophils 68.2, sed rate 37.  XR left ankle shows diffuse soft tissue swelling. 2.6 mm osseous density at the distal tip of the lateral malleolus, likely an old fracture fragment. Recommend correlation with physical exam.  XR left foot shows there is diffuse soft tissue swelling which appears greatest over the forefoot. No acute fracture or dislocation is seen. There is a small plantar calcaneal spur. Probable old fracture fragment is seen at the distal tip of the lateral malleolus.  Patient treated in the ED with IV vancomycin.        Active Hospital Problems:  Active Hospital Problems    Diagnosis    • **Cellulitis of left lower extremity    • Methamphetamine abuse (CMS/[facility])      Plan:     Left lower extremity cellulitis  - Failed outpatient therapy after treatment at [facility] ED  - XR left ankle shows diffuse soft tissue swelling. 2.6 mm osseous density at the distal tip of the lateral malleolus, likely an old fracture fragment  - XR left foot shows there is diffuse soft tissue swelling which appears greatest over the forefoot. No acute fracture or dislocation is seen. There is a small plantar calcaneal spur. Probable old fracture fragment is seen at the distal tip of the lateral malleolus  - Patient treated in the ED with IV vancomycin  - blood cx- NGTD  - continue IV Vancomycin and Rocephin for now   - CT negative for abscess     Dizziness  - orthostatic vital signs unremarkable   - continue meclizine prn      Sepsis ruled out   - Fluid resuscitation with normal saline ordered per sepsis protocol  - Patient received vancomycin IVPB in the ED    Methamphetamine use  - denies any IVDA    DVT Prophylaxis  - enoxaparin                 DVT prophylaxis:  Medical DVT prophylaxis orders are  present.    CODE STATUS:    Level Of Support Discussed With: Patient  Code Status: CPR  Medical Interventions (Level of Support Prior to Arrest): Full      Disposition:  I expect patient to be discharged home pending progress    This patient has been examined wearing appropriate Personal Protective Equipment. 07/17/21      Electronically signed by Winnie Mora DO, 07/17/21, 16:05 EDT.  [facility] Hospitalist Team

## 2021-07-18 VITALS
SYSTOLIC BLOOD PRESSURE: 127 MMHG | DIASTOLIC BLOOD PRESSURE: 60 MMHG | RESPIRATION RATE: 18 BRPM | TEMPERATURE: 97.3 F | HEIGHT: 66 IN | WEIGHT: 170.42 LBS | BODY MASS INDEX: 27.39 KG/M2 | OXYGEN SATURATION: 95 % | HEART RATE: 60 BPM

## 2021-07-18 PROBLEM — B35.3 TINEA PEDIS OF LEFT FOOT: Status: ACTIVE | Noted: 2021-07-18

## 2021-07-18 LAB
ANION GAP SERPL CALCULATED.3IONS-SCNC: 11 MMOL/L (ref 5–15)
BASOPHILS # BLD AUTO: 0.1 10*3/MM3 (ref 0–0.2)
BASOPHILS NFR BLD AUTO: 1.1 % (ref 0–1.5)
BUN SERPL-MCNC: 13 MG/DL (ref 6–20)
BUN/CREAT SERPL: 15.5 (ref 7–25)
CALCIUM SPEC-SCNC: 9 MG/DL (ref 8.6–10.5)
CHLORIDE SERPL-SCNC: 106 MMOL/L (ref 98–107)
CO2 SERPL-SCNC: 23 MMOL/L (ref 22–29)
CREAT SERPL-MCNC: 0.84 MG/DL (ref 0.76–1.27)
CRP SERPL-MCNC: 0.88 MG/DL (ref 0–0.5)
DEPRECATED RDW RBC AUTO: 43.3 FL (ref 37–54)
EOSINOPHIL # BLD AUTO: 0.2 10*3/MM3 (ref 0–0.4)
EOSINOPHIL NFR BLD AUTO: 3.5 % (ref 0.3–6.2)
ERYTHROCYTE [DISTWIDTH] IN BLOOD BY AUTOMATED COUNT: 13.6 % (ref 12.3–15.4)
ERYTHROCYTE [SEDIMENTATION RATE] IN BLOOD: 33 MM/HR (ref 0–15)
GFR SERPL CREATININE-BSD FRML MDRD: 97 ML/MIN/1.73
GLUCOSE SERPL-MCNC: 96 MG/DL (ref 65–99)
HCT VFR BLD AUTO: 41.9 % (ref 37.5–51)
HGB BLD-MCNC: 14.7 G/DL (ref 13–17.7)
LYMPHOCYTES # BLD AUTO: 2.4 10*3/MM3 (ref 0.7–3.1)
LYMPHOCYTES NFR BLD AUTO: 40.7 % (ref 19.6–45.3)
MAGNESIUM SERPL-MCNC: 1.9 MG/DL (ref 1.6–2.6)
MCH RBC QN AUTO: 31.7 PG (ref 26.6–33)
MCHC RBC AUTO-ENTMCNC: 35 G/DL (ref 31.5–35.7)
MCV RBC AUTO: 90.6 FL (ref 79–97)
MONOCYTES # BLD AUTO: 0.4 10*3/MM3 (ref 0.1–0.9)
MONOCYTES NFR BLD AUTO: 6.8 % (ref 5–12)
NEUTROPHILS NFR BLD AUTO: 2.8 10*3/MM3 (ref 1.7–7)
NEUTROPHILS NFR BLD AUTO: 47.9 % (ref 42.7–76)
NRBC BLD AUTO-RTO: 0 /100 WBC (ref 0–0.2)
PLATELET # BLD AUTO: 148 10*3/MM3 (ref 140–450)
PMV BLD AUTO: 10.2 FL (ref 6–12)
POTASSIUM SERPL-SCNC: 4 MMOL/L (ref 3.5–5.2)
RBC # BLD AUTO: 4.63 10*6/MM3 (ref 4.14–5.8)
SODIUM SERPL-SCNC: 140 MMOL/L (ref 136–145)
WBC # BLD AUTO: 5.9 10*3/MM3 (ref 3.4–10.8)

## 2021-07-18 PROCEDURE — 25010000002 VANCOMYCIN: Performed by: INTERNAL MEDICINE

## 2021-07-18 PROCEDURE — 85025 COMPLETE CBC W/AUTO DIFF WBC: CPT | Performed by: INTERNAL MEDICINE

## 2021-07-18 PROCEDURE — 80048 BASIC METABOLIC PNL TOTAL CA: CPT | Performed by: INTERNAL MEDICINE

## 2021-07-18 PROCEDURE — 36415 COLL VENOUS BLD VENIPUNCTURE: CPT | Performed by: INTERNAL MEDICINE

## 2021-07-18 PROCEDURE — 86140 C-REACTIVE PROTEIN: CPT | Performed by: INTERNAL MEDICINE

## 2021-07-18 PROCEDURE — 99239 HOSP IP/OBS DSCHRG MGMT >30: CPT | Performed by: INTERNAL MEDICINE

## 2021-07-18 PROCEDURE — 85652 RBC SED RATE AUTOMATED: CPT | Performed by: INTERNAL MEDICINE

## 2021-07-18 PROCEDURE — 83735 ASSAY OF MAGNESIUM: CPT | Performed by: NURSE PRACTITIONER

## 2021-07-18 RX ORDER — KETOCONAZOLE 20 MG/G
CREAM TOPICAL
Status: DISCONTINUED | OUTPATIENT
Start: 2021-07-18 | End: 2021-07-18 | Stop reason: HOSPADM

## 2021-07-18 RX ORDER — MECLIZINE HYDROCHLORIDE 25 MG/1
25 TABLET ORAL 3 TIMES DAILY PRN
Qty: 15 TABLET | Refills: 0 | OUTPATIENT
Start: 2021-07-18 | End: 2021-10-17

## 2021-07-18 RX ORDER — DOXYCYCLINE 100 MG/1
100 CAPSULE ORAL 2 TIMES DAILY
Qty: 20 CAPSULE | Refills: 0 | Status: SHIPPED | OUTPATIENT
Start: 2021-07-18 | End: 2021-07-28

## 2021-07-18 RX ADMIN — ACETAMINOPHEN 650 MG: 325 TABLET ORAL at 03:48

## 2021-07-18 RX ADMIN — Medication 10 ML: at 09:38

## 2021-07-18 RX ADMIN — VANCOMYCIN HYDROCHLORIDE 1250 MG: 10 INJECTION, POWDER, LYOPHILIZED, FOR SOLUTION INTRAVENOUS at 08:35

## 2021-07-18 NOTE — PLAN OF CARE
Goal Outcome Evaluation:  Plan of Care Reviewed With: patient        Progress: improving   DC orders received.  Nizoral cream given to pt with instructions of use.  No c/o pain or discomfort.  Home care instructions given.  DC home ambulating self to private car he will drive himself.  In no apparent distress/

## 2021-07-18 NOTE — DISCHARGE SUMMARY
Date of Admission: 7/14/2021    Date of Discharge:  7/18/2021    Length of stay:  LOS: 2 days     Admission Diagnosis:   Cellulitis of left lower extremity [L03.116]      Discharge Diagnosis:     Left lower extremity cellulitis  - Failed outpatient therapy after treatment at [facility] ED  - XR left ankle shows diffuse soft tissue swelling. 2.6 mm osseous density at the distal tip of the lateral malleolus, likely an old fracture fragment  - XR left foot shows there is diffuse soft tissue swelling which appears greatest over the forefoot. No acute fracture or dislocation is seen. There is a small plantar calcaneal spur. Probable old fracture fragment is seen at the distal tip of the lateral malleolus  - blood cx- NGTD  - initially on IV Vancomycin and Rocephin  - will transition to doxycycline to complete a total of 14 days of treatment  - CT negative for abscess     Tinea pedia  - continue ketoconazole cream x 6 weeks     Dizziness  - orthostatic vital signs unremarkable   - continue meclizine prn      Sepsis ruled out   - Fluid resuscitation with normal saline ordered per sepsis protocol  - Patient received vancomycin IVPB in the ED     Methamphetamine use  - denies any IVDA  - counseled on need for cessation     Active Hospital Problems    Diagnosis  POA   • **Cellulitis of left lower extremity [L03.116]  Yes   • Tinea pedis of left foot [B35.3]  Yes   • Methamphetamine abuse (CMS/[facility]) [F15.10]  Yes      Resolved Hospital Problems   No resolved problems to display.       Hospital Course:  Patient is a 50 y.o. male with no significant PMH who presents to [facility] ED due to left lower extremity edema.  Patient states left lower extremity edema has progressively worsened over the last 7 days. Patient reports going to [facility] ED and receiving clindamycin which he has been taking for 2 days with no improvement so he decided to come to [facility] ED.  Patient was placed on IV Vancomycin  and IV Rocephin and has had great improvement in his symptoms. He has no signs of abscess and will be transitioned to doxycyline and will follow up with PCP in 1 week.     Procedures Performed:  none         Consults:   Consults     Date and Time Order Name Status Description    7/14/2021  6:53 PM Hospitalist (on-call MD unless specified)            Vital Signs:  Temp:  [97.3 °F (36.3 °C)-97.7 °F (36.5 °C)] 97.4 °F (36.3 °C)  Heart Rate:  [64-75] 64  Resp:  [18-20] 18  BP: (124-166)/(78-89) 145/89        Physical Exam:  Physical Exam  Vitals reviewed.   Constitutional:       General: He is not in acute distress.  HENT:      Head: Normocephalic and atraumatic.   Cardiovascular:      Rate and Rhythm: Normal rate and regular rhythm.   Pulmonary:      Effort: Pulmonary effort is normal. No respiratory distress.   Skin:     General: Skin is warm and dry.      Findings: Erythema and rash present.   Neurological:      Mental Status: He is alert.   Psychiatric:         Mood and Affect: Mood normal.         Behavior: Behavior normal.                 Pertinent Test Results:   Lab Results (last 72 hours)     Procedure Component Value Units Date/Time    Basic Metabolic Panel [[id number]]  (Normal) Collected: 07/18/21 0215    Specimen: Blood Updated: 07/18/21 0349     Glucose 96 mg/dL      BUN 13 mg/dL      Creatinine 0.84 mg/dL      Sodium 140 mmol/L      Potassium 4.0 mmol/L      Chloride 106 mmol/L      CO2 23.0 mmol/L      Calcium 9.0 mg/dL      eGFR Non African Amer 97 mL/min/1.73      BUN/Creatinine Ratio 15.5     Anion Gap 11.0 mmol/L     Narrative:      GFR Normal >60  Chronic Kidney Disease <60  Kidney Failure <15      Magnesium [[id number]]  (Normal) Collected: 07/18/21 0215    Specimen: Blood Updated: 07/18/21 0349     Magnesium 1.9 mg/dL     C-reactive Protein [[id number]]  (Abnormal) Collected: 07/18/21 0215    Specimen: Blood Updated: 07/18/21 0349     C-Reactive Protein 0.88 mg/dL     Sedimentation Rate [[id number]]   (Abnormal) Collected: 07/18/21 0215    Specimen: Blood Updated: 07/18/21 0337     Sed Rate 33 mm/hr     CBC & Differential [[id number]]  (Normal) Collected: 07/18/21 0215    Specimen: Blood Updated: 07/18/21 0327    Narrative:      The following orders were created for panel order CBC & Differential.  Procedure                               Abnormality         Status                     ---------                               -----------         ------                     CBC Auto Differential[[id number]]        Normal              Final result                 Please view results for these tests on the individual orders.    CBC Auto Differential [[id number]]  (Normal) Collected: 07/18/21 0215    Specimen: Blood Updated: 07/18/21 0327     WBC 5.90 10*3/mm3      RBC 4.63 10*6/mm3      Hemoglobin 14.7 g/dL      Hematocrit 41.9 %      MCV 90.6 fL      MCH 31.7 pg      MCHC 35.0 g/dL      RDW 13.6 %      RDW-SD 43.3 fl      MPV 10.2 fL      Platelets 148 10*3/mm3      Neutrophil % 47.9 %      Lymphocyte % 40.7 %      Monocyte % 6.8 %      Eosinophil % 3.5 %      Basophil % 1.1 %      Neutrophils, Absolute 2.80 10*3/mm3      Lymphocytes, Absolute 2.40 10*3/mm3      Monocytes, Absolute 0.40 10*3/mm3      Eosinophils, Absolute 0.20 10*3/mm3      Basophils, Absolute 0.10 10*3/mm3      nRBC 0.0 /100 WBC     Blood Culture - Blood, Arm, Left [[id number]] Collected: 07/14/21 1743    Specimen: Blood from Arm, Left Updated: 07/17/21 1801     Blood Culture No growth at 3 days    Blood Culture - Blood, Arm, Right [[id number]] Collected: 07/14/21 1743    Specimen: Blood from Arm, Right Updated: 07/17/21 1801     Blood Culture No growth at 3 days    Basic Metabolic Panel [[id number]]  (Normal) Collected: 07/17/21 0204    Specimen: Blood Updated: 07/17/21 0343     Glucose 98 mg/dL      BUN 12 mg/dL      Creatinine 0.84 mg/dL      Sodium 142 mmol/L      Potassium 4.4 mmol/L      Chloride 105 mmol/L      CO2 27.0 mmol/L      Calcium 9.1 mg/dL       eGFR Non African Amer 97 mL/min/1.73      BUN/Creatinine Ratio 14.3     Anion Gap 10.0 mmol/L     Narrative:      GFR Normal >60  Chronic Kidney Disease <60  Kidney Failure <15      Magnesium [[id number]]  (Normal) Collected: 07/17/21 0204    Specimen: Blood Updated: 07/17/21 0343     Magnesium 1.9 mg/dL     CBC & Differential [[id number]]  (Normal) Collected: 07/17/21 0204    Specimen: Blood Updated: 07/17/21 0318    Narrative:      The following orders were created for panel order CBC & Differential.  Procedure                               Abnormality         Status                     ---------                               -----------         ------                     CBC Auto Differential[[id number]]        Normal              Final result                 Please view results for these tests on the individual orders.    CBC Auto Differential [[id number]]  (Normal) Collected: 07/17/21 0204    Specimen: Blood Updated: 07/17/21 0318     WBC 6.30 10*3/mm3      RBC 4.38 10*6/mm3      Hemoglobin 13.7 g/dL      Hematocrit 40.1 %      MCV 91.7 fL      MCH 31.3 pg      MCHC 34.1 g/dL      RDW 13.5 %      RDW-SD 42.9 fl      MPV 10.4 fL      Platelets 153 10*3/mm3      Neutrophil % 55.2 %      Lymphocyte % 33.3 %      Monocyte % 6.7 %      Eosinophil % 3.7 %      Basophil % 1.1 %      Neutrophils, Absolute 3.50 10*3/mm3      Lymphocytes, Absolute 2.10 10*3/mm3      Monocytes, Absolute 0.40 10*3/mm3      Eosinophils, Absolute 0.20 10*3/mm3      Basophils, Absolute 0.10 10*3/mm3      nRBC 0.1 /100 WBC     Vancomycin, Trough [[id number]]  (Normal) Collected: 07/16/21 0635    Specimen: Blood Updated: 07/16/21 0719     Vancomycin Trough 10.10 mcg/mL     Narrative:      Therapeutic Ranges for Vancomycin    Vancomycin Random   5.0-40.0 mcg/mL  Vancomycin Trough   5.0-20.0 mcg/mL  Vancomycin Peak     20.0-40.0 mcg/mL    Basic Metabolic Panel [[id number]]  (Abnormal) Collected: 07/16/21 0022    Specimen: Blood Updated: 07/16/21  0108     Glucose 102 mg/dL      BUN 11 mg/dL      Creatinine 1.14 mg/dL      Sodium 139 mmol/L      Potassium 4.3 mmol/L      Chloride 106 mmol/L      CO2 25.0 mmol/L      Calcium 8.4 mg/dL      eGFR Non African Amer 68 mL/min/1.73      BUN/Creatinine Ratio 9.6     Anion Gap 8.0 mmol/L     Narrative:      GFR Normal >60  Chronic Kidney Disease <60  Kidney Failure <15      Vancomycin, Peak [[id number]]  (Abnormal) Collected: 07/16/21 0022    Specimen: Blood Updated: 07/16/21 0108     Vancomycin Peak 18.40 mcg/mL     Narrative:      Therapeutic Ranges for Vancomycin    Vancomycin Random   5.0-40.0 mcg/mL  Vancomycin Trough   5.0-20.0 mcg/mL  Vancomycin Peak     20.0-40.0 mcg/mL    Magnesium [[id number]]  (Normal) Collected: 07/16/21 0022    Specimen: Blood Updated: 07/16/21 0108     Magnesium 2.1 mg/dL     COVID PRE-OP / PRE-PROCEDURE SCREENING ORDER (NO ISOLATION) - Swab, Nasopharynx [[id number]]  (Normal) Collected: 07/14/21 2003    Specimen: Swab from Nasopharynx Updated: 07/15/21 1504    Narrative:      The following orders were created for panel order COVID PRE-OP / PRE-PROCEDURE SCREENING ORDER (NO ISOLATION) - Swab, Nasopharynx.  Procedure                               Abnormality         Status                     ---------                               -----------         ------                     COVID-19,APTIMA PANTHER,...[[id number]]  Normal              Final result                 Please view results for these tests on the individual orders.    COVID-19,APTIMA PANTHER,[facility] IN-HOUSE, NP/OP SWAB IN UTM/VTM/[facility] TRANSPORT MEDIA,24 HR TAT - Swab, Nasopharynx [[id number]]  (Normal) Collected: 07/14/21 2003    Specimen: Swab from Nasopharynx Updated: 07/15/21 1504     COVID19 Not Detected    Narrative:      Fact sheet for providers: https://www.fda.gov/media/911265/download     Fact sheet for patients: https://www.fda.gov/media/524523/download    Test performed by RT PCR.            Imaging Results (All)      Procedure Component Value Units Date/Time    CT Lower Extremity Left With Contrast [[id number]] Collected: 07/17/21 1540     Updated: 07/17/21 1549    Narrative:         DATE OF EXAM:   7/17/2021 10:40 AM     PROCEDURE:   CT LOWER EXTREMITY LEFT W CONTRAST-     INDICATIONS:   erythema and swelling; L03.116-Cellulitis of left lower limb     COMPARISON:  Radiographs 07/14/2021     TECHNIQUE:   CT scan of the left ankle and foot was obtained after the intravenous  administration of  100 mL Isovue 370. Coronal and sagittal reformats  were obtained. Automated exposure control and iterative reconstruction  methods were used.     FINDINGS:   Mineralization appears grossly normal. There is a corticated  ossification at the lateral malleolus, likely the sequelae of a remote  avulsion fracture. Small corticated ossification at the anterior process  of the calcaneus is also likely the sequelae of a remote injury. No  definite acute displaced fracture is seen.     There appears to be diffuse soft tissue edema of the lower leg and ankle  extending into the foot. Edema appears most prominent along the anterior  medial aspect of the ankle and dorsum of the foot. There is enhancement  of the dermal and subcutaneous tissues suspicious for skin and soft  tissue infection. No definite rim-enhancing collection is seen. No  definite foreign body is identified. No definite acute osseous  abnormality is seen. No significant effusion is seen.       Impression:      1.Diffuse soft tissue edema of the visualized lower extremity, most  prominent along the anterior medial aspect of the ankle and dorsum of  the foot. This is nonspecific but may represent skin and soft tissue  infection.  2.No definite collection is seen at this time to indicate abscess.  3.No definite findings of acute osseous abnormality. MRI would be more  sensitive for acute osteomyelitis.     Electronically Signed By-Chris Oliver MD On:7/17/2021 3:47 PM  This report was  finalized on [id number] by  Chris Oliver MD.    XR Ankle 3+ View Left [[id number]] Collected: 07/14/21 1844     Updated: 07/14/21 1848    Narrative:      DATE OF EXAM:  7/14/2021 6:05 PM     PROCEDURE:  XR ANKLE 3+ VW LEFT-     INDICATIONS:  swelling, pain, cellulitis     COMPARISON:  None available.     TECHNIQUE:   A minimum of three radiologic views of the ankle were obtained.     FINDINGS:  There is diffuse soft tissue swelling, greater medially. 6 mm corticated  osseous density at the distal tip of the lateral malleolus, better  visualized on today's foot x-rays. No additional findings concerning for  fracture are seen. Small plantar calcaneal spur is seen.        Impression:      1.Diffuse soft tissue selling.  2.6 mm osseous density at the distal tip of the lateral malleolus,  likely an old fracture fragment. Recommend correlation with physical  exam.     Electronically Signed By-Vannessa Levi MD On:7/14/2021 6:45 PM  This report was finalized on [id number] by  Vannessa Levi MD.    XR Foot 3+ View Left [[id number]] Collected: 07/14/21 1842     Updated: 07/14/21 1846    Narrative:      DATE OF EXAM:  7/14/2021 6:05 PM     PROCEDURE:  XR FOOT 3+ VW LEFT-     INDICATIONS:  swelling, pain, cellulitis     COMPARISON:  None available.     TECHNIQUE:   A minimum of three routine standard radiographic views were obtained of  the left foot.     FINDINGS:  There is diffuse soft tissue swelling which appears greatest over the  forefoot. No acute fracture or dislocation is seen. There is a small  plantar calcaneal spur. Probable old fracture fragment is seen at the  distal tip of the lateral malleolus.        Impression:      Diffuse soft tissue swelling without definite fracture.      Electronically Signed By-Vannessa Levi MD On:7/14/2021 6:44 PM  This report was finalized on [id number] by  Vannessa Levi MD.                Discharge Disposition:  Home or Self Care    Discharge Medications:     Discharge  Medications      New Medications      Instructions Start Date   doxycycline 100 MG capsule  Commonly known as: MONODOX   100 mg, Oral, 2 Times Daily      meclizine 25 MG tablet  Commonly known as: ANTIVERT   25 mg, Oral, 3 Times Daily PRN         Stop These Medications    clindamycin 300 MG capsule  Commonly known as: CLEOCIN            Discharge Diet:   Diet Instructions     Diet: Regular      Discharge Diet: Regular          Activity at Discharge:   Activity Instructions     Activity as Tolerated            Follow-up Appointments  No future appointments.    Additional Instructions for the Follow-ups that You Need to Schedule     Discharge Follow-up with PCP   As directed       Currently Documented PCP:    Provider, No Known    PCP Phone Number:    None     Follow Up Details: one week or sooner if needed               Test Results Pending at Discharge:  none    Condition on Discharge:    Stable      Risk for Readmission (LACE): Score: 7 (7/18/2021  6:00 AM)          Winnie Mora DO  07/18/21  09:26 EDT    Time: Discharge 33 min with face-to-face history/exam, writing all prescriptions, and documenting discharge data including care coordination

## 2021-07-19 ENCOUNTER — READMISSION MANAGEMENT (OUTPATIENT)
Dept: CALL CENTER | Facility: HOSPITAL | Age: 51
End: 2021-07-19

## 2021-07-19 LAB
BACTERIA SPEC AEROBE CULT: NORMAL
BACTERIA SPEC AEROBE CULT: NORMAL
QT INTERVAL: 327 MS

## 2021-07-19 NOTE — CASE MANAGEMENT/SOCIAL WORK
Case Management Discharge Note           Provided Post Acute Provider List?: N/A    Selected Continued Care - Discharged on 7/18/2021 Admission date: 7/14/2021 - Discharge disposition: Home or Self Care                                   Final Discharge Disposition Code: 01 - home or self-care

## 2021-07-19 NOTE — OUTREACH NOTE
Prep Survey      Responses   [facility] facility patient discharged from?  [facility]   Is LACE score < 7 ?  No   Emergency Room discharge w/ pulse ox?  No   Eligibility  Readm Mgmt   Discharge diagnosis  Left lower extremity cellulitis   Does the patient have one of the following disease processes/diagnoses(primary or secondary)?  Other   Does the patient have Home health ordered?  No   Is there a DME ordered?  No   Prep survey completed?  Yes          Fadia Escoto RN

## 2021-07-21 ENCOUNTER — READMISSION MANAGEMENT (OUTPATIENT)
Dept: CALL CENTER | Facility: HOSPITAL | Age: 51
End: 2021-07-21

## 2021-07-21 NOTE — OUTREACH NOTE
Medical Week 1 Survey      Responses   [facility] patient discharged from?  [facility]   Does the patient have one of the following disease processes/diagnoses(primary or secondary)?  Other   Week 1 attempt successful?  No   Unsuccessful attempts  Attempt 1          Aye Montes LPN

## 2021-07-23 ENCOUNTER — READMISSION MANAGEMENT (OUTPATIENT)
Dept: CALL CENTER | Facility: HOSPITAL | Age: 51
End: 2021-07-23

## 2021-07-23 NOTE — OUTREACH NOTE
Medical Week 1 Survey      Responses   [facility] patient discharged from?  [facility]   Does the patient have one of the following disease processes/diagnoses(primary or secondary)?  Other   Week 1 attempt successful?  No   Unsuccessful attempts  Attempt 3          Aye Montes LPN

## 2021-08-04 ENCOUNTER — READMISSION MANAGEMENT (OUTPATIENT)
Dept: CALL CENTER | Facility: HOSPITAL | Age: 51
End: 2021-08-04

## 2021-08-04 NOTE — OUTREACH NOTE
Medical Week 3 Survey      Responses   [facility] patient discharged from?  [facility]   Does the patient have one of the following disease processes/diagnoses(primary or secondary)?  Other   Week 3 attempt successful?  No   Unsuccessful attempts  Attempt 1          Aye Montes LPN

## 2021-08-09 ENCOUNTER — READMISSION MANAGEMENT (OUTPATIENT)
Dept: CALL CENTER | Facility: HOSPITAL | Age: 51
End: 2021-08-09

## 2021-08-09 NOTE — OUTREACH NOTE
Medical Week 3 Survey      Responses   [facility] patient discharged from?  [facility]   Does the patient have one of the following disease processes/diagnoses(primary or secondary)?  Other   Week 3 attempt successful?  Yes   Call start time  1307   Call end time  1311   Discharge diagnosis  Left lower extremity cellulitis   Is patient permission given to speak with other caregiver?  Yes   List who call center can speak with  Samra Seo    Person spoke with today (if not patient) and relationship  Samra Seo - PCP   Meds reviewed with patient/caregiver?  Yes   Is the patient having any side effects they believe may be caused by any medication additions or changes?  No   Does the patient have all medications ordered at discharge?  Yes   Is the patient taking all medications as directed (includes completed medication regime)?  Yes   Does the patient have a primary care provider?   Yes   Does the patient have an appointment with their PCP within 7 days of discharge?  No   What is preventing the patient from scheduling follow up appointments within 7 days of discharge?  Haven't had time   Nursing Interventions  Advised patient to make appointment, Urgent appointment needed   Urgent appointment interventions  Instructed to go to Urgent Care [The patient's friend does not think the cellulitis has improved, he has not seen his PCP nor does he want to see his PCP. Advised to go to urgent care - She states he is stubborn and will not go be seen. ]   Has the patient kept scheduled appointments due by today?  No   What is preventing the patient from keeping their appointments?  Doesn't understand importance   Nursing Interventions  -- [Advised to either make an appt or go to urgent care the patient is not intrested in doing this. ]   Did the patient receive a copy of their discharge instructions?  Yes   Nursing interventions  Reviewed instructions with patient   What is the patient's perception of their health status since  discharge?  Worsening   Is the patient/caregiver able to teach back signs and symptoms related to disease process for when to call PCP?  No   Is the patient/caregiver able to teach back signs and symptoms related to disease process for when to call 911?  No   Is the patient/caregiver able to teach back the hierarchy of who to call/visit for symptoms/problems? PCP, Specialist, Home health nurse, Urgent Care, ED, 911  No   If the patient is a current smoker, are they able to teach back resources for cessation?  [id number]   Week 3 Call Completed?  Yes   Is the patient interested in additional calls from an ambulatory ?  NOTE:  applies to high risk patients requiring additional follow-up.  No   Revoked  No further contact(revokes)-requires comment [Declines to participate.]          Ami Helm RN

## 2021-10-17 ENCOUNTER — APPOINTMENT (OUTPATIENT)
Dept: GENERAL RADIOLOGY | Facility: HOSPITAL | Age: 51
End: 2021-10-17

## 2021-10-17 ENCOUNTER — HOSPITAL ENCOUNTER (EMERGENCY)
Facility: HOSPITAL | Age: 51
Discharge: HOME | End: 2021-10-17
Attending: EMERGENCY MEDICINE | Admitting: EMERGENCY MEDICINE

## 2021-10-17 VITALS
BODY MASS INDEX: 27.46 KG/M2 | HEART RATE: 68 BPM | HEIGHT: 66 IN | SYSTOLIC BLOOD PRESSURE: 124 MMHG | RESPIRATION RATE: 12 BRPM | WEIGHT: 170.86 LBS | DIASTOLIC BLOOD PRESSURE: 75 MMHG | OXYGEN SATURATION: 100 % | TEMPERATURE: 98 F

## 2021-10-17 DIAGNOSIS — M19.031 PRIMARY OSTEOARTHRITIS OF RIGHT WRIST: ICD-10-CM

## 2021-10-17 DIAGNOSIS — S63.501A RIGHT WRIST SPRAIN, INITIAL ENCOUNTER: Primary | ICD-10-CM

## 2021-10-17 PROCEDURE — 73130 X-RAY EXAM OF HAND: CPT

## 2021-10-17 PROCEDURE — 99282 EMERGENCY DEPT VISIT SF MDM: CPT

## 2021-10-17 RX ORDER — TRAMADOL HYDROCHLORIDE 50 MG/1
50 TABLET ORAL EVERY 6 HOURS PRN
Qty: 10 TABLET | Refills: 0 | Status: SHIPPED | OUTPATIENT
Start: 2021-10-17

## 2021-10-17 NOTE — ED PROVIDER NOTES
Subjective   51-year-old male planing of wrist pain.  He states that he had injuries to his wrist in the past.  He reports that he was doing heavy lifting he felt numbness and tingling in his fourth and fifth digit.  He reports that he has had some weakness of  since then.  He reports that he has had no fever chills reports diffuse pain with range of motion does not report pain in the snuffbox or navicular area.          Review of Systems    No past medical history on file.  History of fracture of the contralateral wrist that was treated operatively  No Known Allergies    No past surgical history on file.    No family history on file.    Social History     Socioeconomic History   • Marital status:    Tobacco Use   • Smoking status: Current Every Day Smoker     Packs/day: 1.00     Start date: 7/14/1986   • Smokeless tobacco: Never Used   Vaping Use   • Vaping Use: Never used   Substance and Sexual Activity   • Alcohol use: Never   • Drug use: Never   • Sexual activity: Defer           Objective   Physical Exam  Alert nontoxic Harshal Coma Scale 15  Right upper extremity: The patient is right-hand dominant patient has tenderness noted in the ulnar aspect of the wrist and hand continue about half-way out the hyperthenar eminence.  A full range of motion is intact with normal capillary refill.  There is no increase in pain with axial loading on the thumb.  There is some pain with pronation supination in the lateral ulnar aspect.  There is no direct snuffbox discomfort  Procedures       Was placed in a prefabricated thumb spica splint and was neurovascular intact afterwards    ED Course                                 Labs Reviewed - No data to display  Medications - No data to display  XR Hand 3+ View Right    Result Date: 10/17/2021  No acute osseous abnormality is identified of the right hand.  Electronically Signed By-Vannessa Levi MD On:10/17/2021 11:20 AM This report was finalized on [id number] by   Vannessa Levi MD.              MDM  Number of Diagnoses or Management Options  Risk of Complications, Morbidity, and/or Mortality  Presenting problems: high  Diagnostic procedures: high  Management options: high  General comments: Patient will be started on diclofenac for the next week and be given a prescription for tramadol as needed for breakthrough pain is encouraged to follow-up with primary care provider.  Patient was stable at discharge and vocalized understanding of discharge instructions and warnings        Final diagnoses:   Right wrist sprain, initial encounter   Primary osteoarthritis of right wrist       ED Disposition  ED Disposition     ED Disposition Condition Comment    Discharge Stable           Orthopedist  [phone]             Medication List      No changes were made to your prescriptions during this visit.          Gigi Haley MD  10/17/21 3259

## 2021-10-17 NOTE — ED NOTES
Pt came in states he hurt his right hand on  on Friday. Pt reports he's had issues with both hands before. X-ray obtained.     Lindsey Reyes RN  10/17/21 2100

## 2021-10-17 NOTE — DISCHARGE INSTRUCTIONS
Wear splint for next 5 days  Repeat x-ray for pain continuing greater than 5 days or follow-up with orthopedist  Orthopedic follow-up is important

## 2024-07-12 ENCOUNTER — HOSPITAL ENCOUNTER (EMERGENCY)
Facility: HOSPITAL | Age: 54
Discharge: HOME | End: 2024-07-12
Attending: EMERGENCY MEDICINE
Payer: MEDICAID

## 2024-07-12 VITALS
SYSTOLIC BLOOD PRESSURE: 124 MMHG | OXYGEN SATURATION: 100 % | HEIGHT: 66 IN | WEIGHT: 149.47 LBS | DIASTOLIC BLOOD PRESSURE: 72 MMHG | HEART RATE: 87 BPM | TEMPERATURE: 98.4 F | RESPIRATION RATE: 16 BRPM | BODY MASS INDEX: 24.02 KG/M2
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
## 2024-07-12 PROCEDURE — 99283 EMERGENCY DEPT VISIT LOW MDM: CPT

## 2024-07-12 PROCEDURE — 87468 ANAPLSMA PHGCYTOPHLM AMP PRB: CPT | Performed by: EMERGENCY MEDICINE

## 2024-07-12 PROCEDURE — 36415 COLL VENOUS BLD VENIPUNCTURE: CPT

## 2024-07-12 PROCEDURE — 87484 EHRLICHA CHAFFEENSIS AMP PRB: CPT | Performed by: EMERGENCY MEDICINE

## 2024-07-12 PROCEDURE — 86618 LYME DISEASE ANTIBODY: CPT | Performed by: EMERGENCY MEDICINE

## 2024-07-12 PROCEDURE — 87798 DETECT AGENT NOS DNA AMP: CPT | Performed by: EMERGENCY MEDICINE

## 2024-07-12 RX ORDER — TRIAMCINOLONE ACETONIDE 1 MG/G
1 OINTMENT TOPICAL 2 TIMES DAILY
Qty: 15 G | Refills: 0 | Status: SHIPPED | OUTPATIENT
Start: 2024-07-12

## 2024-07-12 NOTE — ED PROVIDER NOTES
"Subjective   History of Present Illness  53-year-old male describes some itching bumps on his back over the last 3 days.  [place] unsure of insect bites.  He denies any other known ill contact he states that no one else in the household has a rash.  He reports no fevers or chills or shortness of breath.  States he has seen ticks but he has not had any known tick bites or embedded ticks.  Review of Systems    No past medical history on file.  Reportedly negative  No Known Allergies    No past surgical history on file.    No family history on file.    Social History     Socioeconomic History    Marital status:    Tobacco Use    Smoking status: Every Day     Current packs/day: 1.00     Average packs/day: 1 pack/day for 38.0 years (38.0 ttl pk-yrs)     Types: Cigarettes     Start date: 7/14/1986    Smokeless tobacco: Never   Vaping Use    Vaping status: Never Used   Substance and Sexual Activity    Alcohol use: Never    Drug use: Never    Sexual activity: Defer     Prior to Admission medications    Medication Sig Start Date End Date Taking? Authorizing Provider   diclofenac (VOLTAREN) 50 MG EC tablet Take 1 tablet by mouth 2 (Two) Times a Day. 10/17/21   Gigi Haley MD   traMADol (ULTRAM) 50 MG tablet Take 1 tablet by mouth Every 6 (Six) Hours As Needed for Moderate Pain . 10/17/21   Gigi Haley MD   triamcinolone (KENALOG) 0.1 % ointment Apply 1 Application topically to the appropriate area as directed 2 (Two) Times a Day. 7/12/24   Seymour Mcdonnell MD     /69 (BP Location: Left arm, Patient Position: Sitting)   Pulse 94   Temp 98.4 °F (36.9 °C) (Oral)   Resp 16   Ht 167.6 cm (66\")   Wt 67.8 kg (149 lb 7.6 oz)   SpO2 96%   BMI 24.13 kg/m²         Objective   Physical Exam  General: Well-developed well-appearing, no acute distress, alert and appropriate  Eyes: Conjunctiva normal, sclera nonicteric  HEENT: Mucous membranes moist, no mucosal " swelling  Neck: Supple, no nuchal rigidity, no lymphadenopathy  Respirations: Respirations nonlabored, equal breath sounds bilaterally, clear lungs  Heart regular rate and rhythm, no murmurs rubs or gallops,   Abdomen soft nontender nondistended, no hepatosplenomegaly,  Extremities no clubbing cyanosis or edema, calves are symmetric and nontender  Neuro cranial nerves grossly intact, no focal limb deficits  Psych oriented, pleasant affect  Skin some nonspecific bumps and excoriations on the back, no petechiae or purpura, no vesicles or pustules, no pattern eruption.  Procedures           ED Course                                             Medical Decision Making  The rash and bumps appear consistent with some nonspecific insect bites possibly flying insect bites versus tick bites.  There is no embedded ticks he has no other signs of tickborne illness.  He has no signs of systemic illness.  He was ordered a tick panel lab evaluation which is pending at the time of discharge.  He is prescribed some triamcinolone cream and was given warning signs for return.  Patient agreeable to plan.    Problems Addressed:  Insect bite, unspecified site, initial encounter: complicated acute illness or injury    Amount and/or Complexity of Data Reviewed  Labs: ordered.    Risk  Prescription drug management.        Final diagnoses:   Insect bite, unspecified site, initial encounter       ED Disposition  ED Disposition       ED Disposition   Discharge    Condition   Stable    Comment   --               [facility] - [place] [address]  [phone]  Schedule an appointment as soon as possible for a visit in 1 week           Medication List        New Prescriptions      triamcinolone 0.1 % ointment  Commonly known as: KENALOG  Apply 1 Application topically to the appropriate area as directed 2 (Two) Times a Day.               Where to Get Your Medications        These medications were sent to [facility]  [id number] - [place], [place] - [address] AT [address] - [phone]  - [phone]  [address], [place] [address]      Phone: [phone]   triamcinolone 0.1 % ointment            Seymour Mcdonnell MD  07/12/24 1785       Syemour Mcdonnell MD  07/12/24 [id number]

## 2024-07-12 NOTE — DISCHARGE INSTRUCTIONS
Steroid cream as needed for itch twice daily.  Follow-up with your doctor in 1 week for recheck and tick panel results.  Return for fever, increased rash, shortness of breath, vomiting or any other concerns.

## 2024-07-15 LAB — B BURGDOR IGG SER QL: NEGATIVE

## 2024-07-16 LAB
A PHAGOCYTOPH DNA BLD QL NAA+PROBE: NEGATIVE
E CHAFFEENSIS DNA BLD QL NAA+PROBE: NEGATIVE

## 2024-07-18 LAB — RICKETTSIA RICKETTSII DNA, RT: NOT DETECTED
